# Patient Record
Sex: FEMALE | Race: BLACK OR AFRICAN AMERICAN | NOT HISPANIC OR LATINO | Employment: OTHER | URBAN - METROPOLITAN AREA
[De-identification: names, ages, dates, MRNs, and addresses within clinical notes are randomized per-mention and may not be internally consistent; named-entity substitution may affect disease eponyms.]

---

## 2020-05-14 ENCOUNTER — HOSPITAL ENCOUNTER (OUTPATIENT)
Dept: ULTRASOUND IMAGING | Facility: HOSPITAL | Age: 55
Discharge: HOME | End: 2020-05-14
Attending: INTERNAL MEDICINE

## 2020-05-14 LAB
ALBUMIN SERPL-MCNC: 3.2 G/DL (ref 3.5–5)
ALBUMIN/GLOB SERPL: 0.9 {RATIO} (ref 1.4–2.6)
ALP SERPL-CCNC: 185 U/L (ref 53–141)
ALT SERPL-CCNC: 16 U/L (ref 10–40)
ANION GAP SERPL CALC-SCNC: 19 MMOL/L (ref 8–19)
APPEARANCE UR: ABNORMAL
APTT BLD: 24.4 S (ref 22.2–34.2)
AST SERPL-CCNC: 21 U/L (ref 15–50)
BASOPHILS # BLD AUTO: 0.1 10*3/UL (ref 0–0.2)
BASOPHILS NFR BLD AUTO: 0.8 % (ref 0–3)
BILIRUB SERPL-MCNC: 0.34 MG/DL (ref 0.2–1.3)
BILIRUB UR QL: NEGATIVE
BUN SERPL-MCNC: 52 MG/DL (ref 5–25)
BUN/CREAT SERPL: 13 {RATIO} (ref 6–20)
CALCIUM SERPL-MCNC: 8.8 MG/DL (ref 8.7–10.4)
CHLORIDE SERPL-SCNC: 107 MMOL/L (ref 99–111)
COLOR UR: YELLOW
CONV ABS IMM GRAN: 0.04 10*3/UL (ref 0–0.2)
CONV BACTERIA: ABNORMAL
CONV CO2: 18 MMOL/L (ref 22–32)
CONV COLLECTION SOURCE (UA): ABNORMAL
CONV CREATININE URINE, RANDOM: 63.8 MG/DL (ref 10–300)
CONV CREATININE URINE, RANDOM: 65.3 MG/DL (ref 10–300)
CONV IMMATURE GRAN: 0.3 % (ref 0–1.8)
CONV MICROALBUM.,U,RANDOM: 5187.8 MG/L (ref 0–20)
CONV PROTEIN TO CREATININE RATIO (RANDOM URINE): 12.86 {RATIO} (ref 0–0.1)
CONV TOTAL PROTEIN: 6.9 G/DL (ref 6.3–8.2)
CONV UROBILINOGEN IN URINE BY AUTOMATED TEST STRIP: 0.2 {EHRLICHU}/DL (ref 0.1–1)
CREAT UR-MCNC: 4.12 MG/DL (ref 0.5–0.9)
DEPRECATED RDW RBC AUTO: 45.7 FL (ref 36.4–46.3)
EOSINOPHIL # BLD AUTO: 0.58 10*3/UL (ref 0–0.7)
EOSINOPHIL # BLD AUTO: 4.9 % (ref 0–7)
ERYTHROCYTE [DISTWIDTH] IN BLOOD BY AUTOMATED COUNT: 13.7 % (ref 11.7–14.4)
GFR SERPLBLD BASED ON 1.73 SQ M-ARVRAT: 13 ML/MIN/{1.73_M2}
GLOBULIN UR ELPH-MCNC: 3.7 G/DL (ref 2–3.5)
GLUCOSE SERPL-MCNC: 89 MG/DL (ref 65–99)
GLUCOSE UR QL: 100 MG/DL
HCT VFR BLD AUTO: 19.8 % (ref 37–47)
HGB BLD-MCNC: 6.9 G/DL (ref 12–16)
HGB UR QL STRIP: NEGATIVE
INR PPP: 0.85 (ref 2–3)
KETONES UR QL STRIP: NEGATIVE MG/DL
LEUKOCYTE ESTERASE UR QL STRIP: ABNORMAL
LYMPHOCYTES # BLD AUTO: 3.72 10*3/UL (ref 1–5)
LYMPHOCYTES NFR BLD AUTO: 31.1 % (ref 20–45)
MCH RBC QN AUTO: 31.8 PG (ref 27–31)
MCHC RBC AUTO-ENTMCNC: 34.8 G/DL (ref 33–37)
MCV RBC AUTO: 91.2 FL (ref 81–99)
MICROALBUMIN/CREAT UR: 7944.6 MG/G{CRE} (ref 0–35)
MONOCYTES # BLD AUTO: 0.48 10*3/UL (ref 0.2–1.2)
MONOCYTES NFR BLD AUTO: 4 % (ref 3–10)
NEUTROPHILS # BLD AUTO: 7.03 10*3/UL (ref 2–8)
NEUTROPHILS NFR BLD AUTO: 58.9 % (ref 30–85)
NITRITE UR QL STRIP: NEGATIVE
NRBC CBCN: 5.1 % (ref 0–0.7)
OSMOLALITY SERPL CALC.SUM OF ELEC: 302 MOSM/KG (ref 273–304)
PH UR STRIP.AUTO: 6.5 [PH] (ref 5–8)
PLATELET # BLD AUTO: 387 10*3/UL (ref 130–400)
PMV BLD AUTO: 10.6 FL (ref 9.4–12.3)
POTASSIUM SERPL-SCNC: 4.5 MMOL/L (ref 3.5–5.3)
PROT UR QL: >300 MG/DL
PROT UR-MCNC: 820.7 MG/DL
PROTHROMBIN TIME: 9.5 S (ref 9.4–12)
RBC # BLD AUTO: 2.17 10*6/UL (ref 4.2–5.4)
RBC #/AREA URNS HPF: ABNORMAL /[HPF]
SODIUM SERPL-SCNC: 139 MMOL/L (ref 135–147)
SP GR UR: 1.01 (ref 1–1.03)
SQUAMOUS SPT QL MICRO: ABNORMAL /[HPF]
WBC # BLD AUTO: 11.95 10*3/UL (ref 4.8–10.8)
WBC #/AREA URNS HPF: ABNORMAL /[HPF]

## 2020-05-15 LAB
ALBUMIN SERPL-MCNC: 2.6 G/DL (ref 2.9–4.4)
ALBUMIN/GLOB SERPL: 0.7 {RATIO} (ref 0.7–1.7)
ALPHA2 GLOB SERPL ELPH-MCNC: 0.9 G/DL (ref 0.4–1)
BETA GLOBULIN: 1.2 G/DL (ref 0.7–1.3)
C3 SERPL-MCNC: 115 MG/DL (ref 82–167)
C4 SERPL-MCNC: 43 MG/DL (ref 14–44)
CH50 SERPL-ACNC: >60 U/ML
CONV ALPHA-1-GLOBULIN: 0.3 G/DL (ref 0–0.4)
CONV HEPATITIS B SURFACE AG W CONFIRMATION RE: NEGATIVE
CONV HEPCO: NORMAL
CONV IMMUNOGLOBULIN G (IGG): 1103 MG/DL (ref 586–1602)
CONV IMMUNOGLOBULIN M (IGM): 71 MG/DL (ref 26–217)
CONV PE INTERPRETATION: ABNORMAL
CONV PE NOTE: ABNORMAL
CONV TOTAL PROTEIN: 6.1 G/DL (ref 6–8.5)
DSDNA AB SER-ACNC: <1 IU/ML (ref 0–9)
GAMMA GLOB SERPL ELPH-MCNC: 1.1 G/DL (ref 0.4–1.8)
GLOBULIN UR ELPH-MCNC: 3.5 G/DL (ref 2.2–3.9)
HAV IGM SERPL QL IA: NEGATIVE
HBV CORE AB SER DONR QL IA: NEGATIVE
HBV CORE IGM SERPL QL IA: NEGATIVE
HBV E AB SERPL QL IA: NEGATIVE
HBV E AG SERPL QL IA: NEGATIVE
HBV SURFACE AB SER QL: NON REACTIVE
HCV AB S/CO SERPL IA: >11 S/CO RATIO (ref 0–0.9)
IGA SERPL-MCNC: 363 MG/DL (ref 87–352)
M-SPIKE: ABNORMAL G/DL
PROT PATTERN SERPL IFE-IMP: ABNORMAL

## 2020-05-17 LAB — CONV ANTI NEUTROPHILIC CYTOPLASMIC AB W/REFLEX: NORMAL

## 2020-05-19 LAB — CONV MYELOPEROXIDASE ANTIBODY: <9 U/ML (ref 0–9)

## 2021-01-11 ENCOUNTER — OFFICE VISIT CONVERTED (OUTPATIENT)
Dept: SURGERY | Facility: CLINIC | Age: 56
End: 2021-01-11
Attending: SURGERY

## 2021-01-15 ENCOUNTER — OFFICE VISIT CONVERTED (OUTPATIENT)
Dept: CARDIOLOGY | Facility: CLINIC | Age: 56
End: 2021-01-15
Attending: SPECIALIST

## 2021-01-29 ENCOUNTER — HOSPITAL ENCOUNTER (OUTPATIENT)
Dept: PREADMISSION TESTING | Facility: HOSPITAL | Age: 56
Discharge: HOME | End: 2021-01-29
Attending: SURGERY

## 2021-01-30 LAB — SARS-COV-2 RNA SPEC QL NAA+PROBE: NOT DETECTED

## 2021-02-03 ENCOUNTER — HOSPITAL ENCOUNTER (OUTPATIENT)
Dept: PERIOP | Facility: HOSPITAL | Age: 56
Setting detail: HOSPITAL OUTPATIENT SURGERY
Discharge: HOME | End: 2021-02-03
Attending: SURGERY

## 2021-02-03 LAB
ANION GAP SERPL CALC-SCNC: 19 MMOL/L (ref 8–19)
BASOPHILS # BLD AUTO: 0.14 10*3/UL (ref 0–0.2)
BASOPHILS NFR BLD AUTO: 1.2 % (ref 0–3)
BUN SERPL-MCNC: 30 MG/DL (ref 5–25)
BUN/CREAT SERPL: 5 {RATIO} (ref 6–20)
CALCIUM SERPL-MCNC: 7.2 MG/DL (ref 8.7–10.4)
CHLORIDE SERPL-SCNC: 99 MMOL/L (ref 99–111)
CONV ABS IMM GRAN: 0.04 10*3/UL (ref 0–0.2)
CONV CO2: 19 MMOL/L (ref 22–32)
CONV IMMATURE GRAN: 0.3 % (ref 0–1.8)
CREAT UR-MCNC: 6.37 MG/DL (ref 0.5–0.9)
DEPRECATED RDW RBC AUTO: 43.8 FL (ref 36.4–46.3)
EOSINOPHIL # BLD AUTO: 0.51 10*3/UL (ref 0–0.7)
EOSINOPHIL # BLD AUTO: 4.3 % (ref 0–7)
ERYTHROCYTE [DISTWIDTH] IN BLOOD BY AUTOMATED COUNT: 13.2 % (ref 11.7–14.4)
GFR SERPLBLD BASED ON 1.73 SQ M-ARVRAT: 8 ML/MIN/{1.73_M2}
GLUCOSE SERPL-MCNC: 198 MG/DL (ref 65–99)
HCT VFR BLD AUTO: 28.4 % (ref 37–47)
HGB BLD-MCNC: 10.3 G/DL (ref 12–16)
LYMPHOCYTES # BLD AUTO: 3.62 10*3/UL (ref 1–5)
LYMPHOCYTES NFR BLD AUTO: 30.3 % (ref 20–45)
MCH RBC QN AUTO: 32.5 PG (ref 27–31)
MCHC RBC AUTO-ENTMCNC: 36.3 G/DL (ref 33–37)
MCV RBC AUTO: 89.6 FL (ref 81–99)
MONOCYTES # BLD AUTO: 0.75 10*3/UL (ref 0.2–1.2)
MONOCYTES NFR BLD AUTO: 6.3 % (ref 3–10)
NEUTROPHILS # BLD AUTO: 6.9 10*3/UL (ref 2–8)
NEUTROPHILS NFR BLD AUTO: 57.6 % (ref 30–85)
NRBC CBCN: 2.2 % (ref 0–0.7)
OSMOLALITY SERPL CALC.SUM OF ELEC: 288 MOSM/KG (ref 273–304)
PLATELET # BLD AUTO: 346 10*3/UL (ref 130–400)
PMV BLD AUTO: 10.3 FL (ref 9.4–12.3)
POTASSIUM SERPL-SCNC: 3.6 MMOL/L (ref 3.5–5.3)
RBC # BLD AUTO: 3.17 10*6/UL (ref 4.2–5.4)
SODIUM SERPL-SCNC: 133 MMOL/L (ref 135–147)
WBC # BLD AUTO: 11.96 10*3/UL (ref 4.8–10.8)

## 2021-02-17 ENCOUNTER — HOSPITAL ENCOUNTER (OUTPATIENT)
Dept: OTHER | Facility: HOSPITAL | Age: 56
Discharge: HOME | End: 2021-02-17
Attending: RADIOLOGY

## 2021-02-17 LAB
ANION GAP SERPL CALC-SCNC: 15 MMOL/L (ref 8–19)
APTT BLD: 24.3 S (ref 22.2–34.2)
BASOPHILS # BLD AUTO: 0.09 10*3/UL (ref 0–0.2)
BASOPHILS NFR BLD AUTO: 0.5 % (ref 0–3)
BUN SERPL-MCNC: 47 MG/DL (ref 5–25)
BUN/CREAT SERPL: 5 {RATIO} (ref 6–20)
CALCIUM SERPL-MCNC: 8.6 MG/DL (ref 8.7–10.4)
CHLORIDE SERPL-SCNC: 97 MMOL/L (ref 99–111)
CONV ABS IMM GRAN: 0.08 10*3/UL (ref 0–0.2)
CONV CO2: 26 MMOL/L (ref 22–32)
CONV IMMATURE GRAN: 0.4 % (ref 0–1.8)
CREAT UR-MCNC: 9.6 MG/DL (ref 0.5–0.9)
DEPRECATED RDW RBC AUTO: 43.9 FL (ref 36.4–46.3)
EOSINOPHIL # BLD AUTO: 0.58 10*3/UL (ref 0–0.7)
EOSINOPHIL # BLD AUTO: 3.2 % (ref 0–7)
ERYTHROCYTE [DISTWIDTH] IN BLOOD BY AUTOMATED COUNT: 13.3 % (ref 11.7–14.4)
GFR SERPLBLD BASED ON 1.73 SQ M-ARVRAT: 5 ML/MIN/{1.73_M2}
GLUCOSE SERPL-MCNC: 112 MG/DL (ref 65–99)
HCT VFR BLD AUTO: 22.4 % (ref 37–47)
HGB BLD-MCNC: 8.3 G/DL (ref 12–16)
INR PPP: 0.85 (ref 2–3)
LYMPHOCYTES # BLD AUTO: 4.32 10*3/UL (ref 1–5)
LYMPHOCYTES NFR BLD AUTO: 23.7 % (ref 20–45)
MCH RBC QN AUTO: 33.7 PG (ref 27–31)
MCHC RBC AUTO-ENTMCNC: 37.1 G/DL (ref 33–37)
MCV RBC AUTO: 91.1 FL (ref 81–99)
MONOCYTES # BLD AUTO: 0.94 10*3/UL (ref 0.2–1.2)
MONOCYTES NFR BLD AUTO: 5.2 % (ref 3–10)
NEUTROPHILS # BLD AUTO: 12.23 10*3/UL (ref 2–8)
NEUTROPHILS NFR BLD AUTO: 67 % (ref 30–85)
NRBC CBCN: 0.9 % (ref 0–0.7)
OSMOLALITY SERPL CALC.SUM OF ELEC: 293 MOSM/KG (ref 273–304)
PLATELET # BLD AUTO: 388 10*3/UL (ref 130–400)
PMV BLD AUTO: 10.6 FL (ref 9.4–12.3)
POTASSIUM SERPL-SCNC: 3.3 MMOL/L (ref 3.5–5.3)
PROTHROMBIN TIME: 9.6 S (ref 9.4–12)
RBC # BLD AUTO: 2.46 10*6/UL (ref 4.2–5.4)
SODIUM SERPL-SCNC: 135 MMOL/L (ref 135–147)
WBC # BLD AUTO: 18.24 10*3/UL (ref 4.8–10.8)

## 2021-02-23 ENCOUNTER — CONVERSION ENCOUNTER (OUTPATIENT)
Dept: SURGERY | Facility: CLINIC | Age: 56
End: 2021-02-23

## 2021-02-23 ENCOUNTER — OFFICE VISIT CONVERTED (OUTPATIENT)
Dept: SURGERY | Facility: CLINIC | Age: 56
End: 2021-02-23
Attending: SURGERY

## 2021-03-22 ENCOUNTER — HOSPITAL ENCOUNTER (OUTPATIENT)
Dept: PREADMISSION TESTING | Facility: HOSPITAL | Age: 56
Discharge: HOME | End: 2021-03-22
Attending: SURGERY

## 2021-03-22 ENCOUNTER — OFFICE VISIT CONVERTED (OUTPATIENT)
Dept: SURGERY | Facility: CLINIC | Age: 56
End: 2021-03-22
Attending: SURGERY

## 2021-03-22 LAB — SARS-COV-2 RNA SPEC QL NAA+PROBE: NOT DETECTED

## 2021-03-24 ENCOUNTER — HOSPITAL ENCOUNTER (OUTPATIENT)
Dept: PERIOP | Facility: HOSPITAL | Age: 56
Setting detail: HOSPITAL OUTPATIENT SURGERY
Discharge: HOME | End: 2021-03-24
Attending: SURGERY

## 2021-03-24 LAB
ANION GAP SERPL CALC-SCNC: 15 MMOL/L (ref 8–19)
BASOPHILS # BLD AUTO: 0.2 10*3/UL (ref 0–0.2)
BASOPHILS NFR BLD AUTO: 1.4 % (ref 0–3)
BUN SERPL-MCNC: 10 MG/DL (ref 5–25)
BUN/CREAT SERPL: 2 {RATIO} (ref 6–20)
CALCIUM SERPL-MCNC: 8.6 MG/DL (ref 8.7–10.4)
CHLORIDE SERPL-SCNC: 102 MMOL/L (ref 99–111)
CONV ABS IMM GRAN: 0.06 10*3/UL (ref 0–0.2)
CONV CO2: 23 MMOL/L (ref 22–32)
CONV IMMATURE GRAN: 0.4 % (ref 0–1.8)
CREAT UR-MCNC: 4.84 MG/DL (ref 0.5–0.9)
DEPRECATED RDW RBC AUTO: 45.9 FL (ref 36.4–46.3)
EOSINOPHIL # BLD AUTO: 0.43 10*3/UL (ref 0–0.7)
EOSINOPHIL # BLD AUTO: 3 % (ref 0–7)
ERYTHROCYTE [DISTWIDTH] IN BLOOD BY AUTOMATED COUNT: 14.9 % (ref 11.7–14.4)
GFR SERPLBLD BASED ON 1.73 SQ M-ARVRAT: 11 ML/MIN/{1.73_M2}
GLUCOSE SERPL-MCNC: 97 MG/DL (ref 65–99)
HCT VFR BLD AUTO: 29.9 % (ref 37–47)
HGB BLD-MCNC: 10.7 G/DL (ref 12–16)
LYMPHOCYTES # BLD AUTO: 3.97 10*3/UL (ref 1–5)
LYMPHOCYTES NFR BLD AUTO: 27.8 % (ref 20–45)
MCH RBC QN AUTO: 30.6 PG (ref 27–31)
MCHC RBC AUTO-ENTMCNC: 35.8 G/DL (ref 33–37)
MCV RBC AUTO: 85.4 FL (ref 81–99)
MONOCYTES # BLD AUTO: 1.12 10*3/UL (ref 0.2–1.2)
MONOCYTES NFR BLD AUTO: 7.8 % (ref 3–10)
NEUTROPHILS # BLD AUTO: 8.5 10*3/UL (ref 2–8)
NEUTROPHILS NFR BLD AUTO: 59.6 % (ref 30–85)
NRBC CBCN: 1.3 % (ref 0–0.7)
OSMOLALITY SERPL CALC.SUM OF ELEC: 279 MOSM/KG (ref 273–304)
PLATELET # BLD AUTO: 353 10*3/UL (ref 130–400)
PMV BLD AUTO: 10.6 FL (ref 9.4–12.3)
POTASSIUM SERPL-SCNC: 4.6 MMOL/L (ref 3.5–5.3)
RBC # BLD AUTO: 3.5 10*6/UL (ref 4.2–5.4)
SODIUM SERPL-SCNC: 135 MMOL/L (ref 135–147)
WBC # BLD AUTO: 14.28 10*3/UL (ref 4.8–10.8)

## 2021-03-26 LAB
BACTERIA SPEC ANAEROBE CULT: NORMAL
BACTERIA SPEC ANAEROBE CULT: NORMAL

## 2021-03-27 LAB
BACTERIA FLD CULT: NORMAL
BACTERIA SPEC AEROBE CULT: NORMAL

## 2021-04-06 ENCOUNTER — OFFICE VISIT CONVERTED (OUTPATIENT)
Dept: SURGERY | Facility: CLINIC | Age: 56
End: 2021-04-06
Attending: SURGERY

## 2021-05-10 NOTE — H&P
History and Physical      Patient Name: Clarissa Augustin   Patient ID: [id number]   Sex: Female   YOB: 1965    Referring Provider: Ryley Moran    Visit Date: January 11, 2021    Provider: Miki Mendoza MD   Location: [facility]   Location Address: [address] [place] [address]  [id number]   Location Phone: [phone]          Chief Complaint  · Outpatient History & Physical / Surgical Orders  · ESRD      History Of Present Illness     Ms. Augustin came in today for evaluation. She is a really nice lady who had end stage renal disease due to sickle cell anemia. She is currently undergoing hemodialysis through a tunnel dialysis catheter. She would like to try peritoneal dialysis if at all possible.       Past Medical History  Anemia, Unspecified; Blood Diseases; Heart Attack; High blood pressure; Kidney Disease         Past Surgical History  Appendectomy; Gallbladder         Medication List  acetaminophen 500 mg oral tablet; hydralazine 25 mg oral tablet; metoprolol succinate 50 mg oral tablet extended release 24 hr; nitroglycerin 0.4 mg sublingual tablet, sublingual         Allergy List  Latex Exam Gloves         Family Medical History  Diabetes, unspecified type; Family history of breast cancer         Social History  Tobacco (Current every day)         Review of Systems  · Constitutional  o Denies  o : fever  · Eyes  o Denies  o : yellowish discoloration of eyes  · HENT  o Denies  o : difficulty swallowing  · Cardiovascular  o Denies  o : chest pain on exertion  · Respiratory  o Denies  o : shortness of breath  · Gastrointestinal  o Denies  o : nausea, vomiting, diarrhea, constipation  · Integument  o Denies  o : rash  · Neurologic  o Denies  o : tingling or numbness  · Musculoskeletal  o Denies  o : joint pain  · Endocrine  o Denies  o : weight gain, weight loss      Vitals  Date Time BP Position Site L\R Cuff Size HR RR TEMP (F) WT  HT  BMI kg/m2 BSA m2 O2 Sat  "FR L/min FiO2 HC       01/11/2021 01:18 PM       16  168lbs 0oz 5'  6\" 27.12 1.88             Physical Examination  · Constitutional  o Appearance  o : well-nourished, well developed, alert, in no acute distress  · Head and Face  o Head  o :   § Inspection  § : atraumatic, normocephalic  · Neck  o Inspection/Palpation  o : supple, normal range of motion  · Respiratory  o Inspection of Chest  o : normal inspection  o Auscultation of Lungs  o : breath sounds normal, no distress, clear to ascultate bilaterally  · Cardiovascular  o Heart  o :   § Auscultation of Heart  § : regular rate and rhythm, no murmur, gallop or rub  · Gastrointestinal  o Abdominal Examination  o : normal bowel sounds, non-tender, soft          Assessment  · Pre-Surgical Orders     V72.84  · ESRD (end stage renal disease)     585.6/N18.6       Very nice lady who has end stage renal disease. She would like to try peritoneal dialysis.       Plan  · Orders  o [facility] Pre-Op Covid-19 Screening (92857) - 585.6/N18.6 - 01/29/2021  o General Surgery Order ([facility]) - - 01/11/2021  · Medications  o Medications have been Reconciled  o Transition of Care or Provider Policy  · Instructions  o ****Surgical Orders****  o Outpatient  o RISK AND BENEFITS:  o Consent for surgery: Given these options, the patient has verbally expressed an understanding of the risks of surgery and finds these risks acceptable. We will proceed with surgery as soon as possible.  o Consult Anesthesia for any post-operative block, or any pain management procedure deemed necessary by the anestesiologist for adequate post-operative pain control.   o O.R. PREP: Per protocol  o SCD's preoperatively  o PLEASE SIGN PERMIT FOR: Laparoscopic peritoneal dialysis catheter placement  o *__Kefzol 2 gram IV on call to OR.  o *___The above History and Physical Examination has been completed within 30 days of admission.     We will set her up for a laparoscopic peritoneal dialysis catheter placement. I " have described the procedure to her as well as the risks and benefits and she is agreeable to proceeding.             Electronically Signed by: Iesha Meadows MA -Author on January 28, 2021 10:24:06 AM

## 2021-05-14 VITALS — WEIGHT: 168 LBS | HEIGHT: 66 IN | RESPIRATION RATE: 16 BRPM | BODY MASS INDEX: 27 KG/M2

## 2021-05-14 VITALS — WEIGHT: 166 LBS | HEIGHT: 66 IN | BODY MASS INDEX: 26.68 KG/M2 | RESPIRATION RATE: 14 BRPM

## 2021-05-14 VITALS — BODY MASS INDEX: 26.52 KG/M2 | HEIGHT: 66 IN | RESPIRATION RATE: 16 BRPM | WEIGHT: 165 LBS

## 2021-05-14 VITALS
SYSTOLIC BLOOD PRESSURE: 182 MMHG | DIASTOLIC BLOOD PRESSURE: 88 MMHG | WEIGHT: 166 LBS | HEART RATE: 78 BPM | BODY MASS INDEX: 26.68 KG/M2 | HEIGHT: 66 IN

## 2021-05-14 VITALS — WEIGHT: 166 LBS | RESPIRATION RATE: 16 BRPM | HEIGHT: 66 IN | BODY MASS INDEX: 26.68 KG/M2

## 2021-05-14 NOTE — PROGRESS NOTES
"   Progress Note      Patient Name: Clarissa Augustin   Patient ID: [id number]   Sex: Female   YOB: 1965    Referring Provider: Ryley Moran    Visit Date: January 15, 2021    Provider: Fabian Gutierrez MD   [facility]   Location Address: [address] [place] [address], [address]   [place]  [id number]   Location Phone: [phone]          Chief Complaint  · Hypertension       History Of Present Illness  Clarissa Augustin is a 55-year-old female with sickle cell anemia and end-stage renal disease, on dialysis. Blood pressure is intermittently high.   CURRENT MEDICATIONS: Carvedilol 3.125 mg 3 tablets b.i.d. p.r.n.; folic acid 1 mg daily; hydrocodone/acetaminophen 5-325 mg; magnesium oxide 400 mg daily; Sennosides-Docusate; Amlodipine 10 mg daily; Levofloxacin 750 mg daily; Metoprolol tartrate 50 mg daily; nitroglycerin 0.4 mg p.r.n.; Albuterol p.r.n.   PAST MEDICAL HISTORY: Sickle cell anemia; metabolic acidosis; hypertension; chronic atypical chest pain; end-stage renal disease, on dialysis.   PSYCHOSOCIAL HISTORY: Denies alcohol use. Current tobacco use.      ALLERGIES:  No known drug allergies.       Review of Systems  · Cardiovascular  o Denies  o : palpitations (fast, fluttering, or skipping beats), swelling (feet, ankles, hands), shortness of breath while walking or lying flat, chest pain or angina pectoris   · Respiratory  o Denies  o : chronic or frequent cough      Vitals  Date Time BP Position Site L\R Cuff Size HR RR TEMP (F) WT  HT  BMI kg/m2 BSA m2 O2 Sat FR L/min FiO2 HC       01/15/2021 11:35 /88 Sitting    78 - R   165lbs 16oz 5'  6\" 26.79 1.87       01/15/2021 11:35 /84 Sitting    78 - R                   Physical Examination  · Constitutional  o Appearance  o : Awake, alert, cooperative, pleasant.  · Respiratory  o Inspection of Chest  o : No chest wall deformities, moving equal.  o Auscultation of Lungs  o : Good air entry with vesicular breath " sounds.  · Cardiovascular  o Heart  o :   § Auscultation of Heart  § : S1 and S2 regular. No S3. No S4. No murmurs.  o Peripheral Vascular System  o :   § Extremities  § : Peripheral pulses were well felt. No edema. No cyanosis.  · Gastrointestinal  o Abdominal Examination  o : No masses or organomegaly noted.          Assessment     ASSESSMENT AND PLAN:  1.  End-stage renal disease, on dialysis, stable.   2.  Essential hypertension, uncontrolled. Asked her to discontinue Metoprolol. Increase Carvedilol to 12.5 mg        b.i.d. Explained at length about compliance with medication. Recent echocardiogram showed normal left        ventricular systolic function. She has had a stress test in [place] which was negative. Will get her        stress test report from [place].      Fabian Gutierrez MD  TER[facility]/pap             Electronically Signed by: Katherine Saeed-, Other -Author on January 28, 2021 02:49:06 PM  Electronically Co-signed by: Fabian Gutierrez MD -Reviewer on February 8, 2021 09:17:09 AM

## 2021-05-14 NOTE — PROGRESS NOTES
"   Progress Note      Patient Name: Clarissa Augustin   Patient ID: [id number]   Sex: Female   YOB: 1965    Referring Provider: Ryley Moran    Visit Date: April 6, 2021    Provider: Miki Mendoza MD   Location: [facility]   Location Address: [address] [place] [address]  [id number]   Location Phone: [phone]          Chief Complaint  · Follow Up Office Visit      History Of Present Illness     Ms. Augustin came in today for evaluation. We did a laparoscopy for her and it turns out she did have peritonitis. We removed that catheter. She is doing fine now and had no complaints.       Past Medical History  Anemia, Unspecified; Blood Diseases; Heart Attack; High blood pressure; Kidney Disease         Past Surgical History  Appendectomy; Gallbladder; PD Catheter Placement         Medication List  acetaminophen 500 mg oral tablet; hydralazine 25 mg oral tablet; metoprolol succinate 50 mg oral tablet extended release 24 hr; nitroglycerin 0.4 mg sublingual tablet, sublingual         Allergy List  Latex Exam Gloves         Family Medical History  Diabetes, unspecified type; Family history of breast cancer         Social History  Tobacco (Current every day)         Review of Systems  · Cardiovascular  o Denies  o : chest pain, irregular heart beats, rapid heart rate, chest pain on exertion, shortness of breath, lower extremity swelling  · Respiratory  o Denies  o : shortness of breath, wheezing, cough, wheezing, chronic cough, coughing up blood  · Gastrointestinal  o Denies  o : nausea, vomiting, diarrhea, chronic abdominal pain, reflux symptoms      Vitals  Date Time BP Position Site L\R Cuff Size HR RR TEMP (F) WT  HT  BMI kg/m2 BSA m2 O2 Sat FR L/min FiO2        04/06/2021 01:17 PM       14  165lbs 16oz 5'  6\" 26.79 1.87             Physical Examination     Today on physical exam, her incisions all look good and we removed her sutures. "           Assessment  · Postoperative Exam Following Surgery     V67.00       Doing better status post removal of a peritoneal dialysis catheter for peritonitis.       Plan  · Medications  o Medications have been Reconciled  o Transition of Care or Provider Policy     I have told Ms. Augustin that I would probably wait at least four months before trying to replace the catheter, just to make sure we didn't run the risk of re-infecting it. She was fine with that and indicated that she would come back and see me in about four months.             Electronically Signed by: Mary Ann Fowler-, -Author on April 7, 2021 09:30:01 AM  Electronically Co-signed by: Miki Mendoza MD -Reviewer on April 7, 2021 02:34:50 PM

## 2021-05-14 NOTE — PROGRESS NOTES
"   Progress Note      Patient Name: Clarissa Augustin   Patient ID: [id number]   Sex: Female   YOB: 1965    Referring Provider: Ryley Moran    Visit Date: February 23, 2021    Provider: Miki Mendoza MD   Location: [facility]   Location Address: [address] [place] [address]  [id number]   Location Phone: [phone]          Chief Complaint  · Follow Up Office Visit      History Of Present Illness     Ms. Augustin came in today for evaluation. She is doing well following a laparoscopic peritoneal dialysis catheter placement. She is without complaints today.       Past Medical History  Anemia, Unspecified; Blood Diseases; Heart Attack; High blood pressure; Kidney Disease         Past Surgical History  Appendectomy; Gallbladder; PD Catheter Placement         Medication List  acetaminophen 500 mg oral tablet; hydralazine 25 mg oral tablet; metoprolol succinate 50 mg oral tablet extended release 24 hr; nitroglycerin 0.4 mg sublingual tablet, sublingual         Allergy List  Latex Exam Gloves         Family Medical History  Diabetes, unspecified type; Family history of breast cancer         Social History  Tobacco (Current every day)         Review of Systems  · Cardiovascular  o Denies  o : chest pain, irregular heart beats, rapid heart rate, chest pain on exertion, shortness of breath, lower extremity swelling  · Respiratory  o Denies  o : shortness of breath, wheezing, cough, wheezing, chronic cough, coughing up blood  · Gastrointestinal  o Denies  o : nausea, vomiting, diarrhea, chronic abdominal pain, reflux symptoms      Vitals  Date Time BP Position Site L\R Cuff Size HR RR TEMP (F) WT  HT  BMI kg/m2 BSA m2 O2 Sat FR L/min FiO2 HC       02/23/2021 12:50 PM       16  165lbs 0oz 5'  6\" 26.63 1.87             Physical Examination     Today on physical exam, the exit site looks good and we removed the suture.           Assessment  · Postoperative Exam Following " Surgery     V67.00       Doing okay status post laparoscopic peritoneal dialysis catheter placement.       Plan  · Medications  o Medications have been Reconciled  o Transition of Care or Provider Policy  · Instructions  o Follow up as needed.            Electronically Signed by: Mary Ann Fowler-, -Author on February 23, 2021 04:38:02 PM  Electronically Co-signed by: Miki Mendoza MD -Reviewer on February 24, 2021 08:37:27 AM

## 2021-08-16 RX ORDER — CARVEDILOL 25 MG/1
25 TABLET ORAL 2 TIMES DAILY
COMMUNITY
Start: 2021-07-19

## 2021-08-16 RX ORDER — ONDANSETRON 4 MG/1
4 TABLET, FILM COATED ORAL EVERY 6 HOURS
COMMUNITY
Start: 2021-07-31

## 2021-08-16 RX ORDER — SODIUM BICARBONATE 650 MG/1
650 TABLET ORAL 2 TIMES DAILY
COMMUNITY
Start: 2021-07-19

## 2021-08-16 RX ORDER — MAGNESIUM OXIDE 400 MG/1
1 TABLET ORAL DAILY
COMMUNITY
Start: 2021-07-19

## 2021-08-16 RX ORDER — LOSARTAN POTASSIUM 100 MG/1
80 TABLET ORAL 2 TIMES DAILY
COMMUNITY
Start: 2021-07-19

## 2021-08-16 RX ORDER — FOLIC ACID 1 MG/1
1000 TABLET ORAL DAILY
COMMUNITY
Start: 2021-07-28

## 2021-08-16 RX ORDER — AMLODIPINE BESYLATE 10 MG/1
10 TABLET ORAL DAILY
COMMUNITY
Start: 2021-07-19

## 2021-08-16 RX ORDER — FUROSEMIDE 80 MG
80 TABLET ORAL 2 TIMES DAILY
COMMUNITY
Start: 2021-07-19

## 2021-08-16 RX ORDER — DOCUSATE SODIUM -SENNOSIDES 50; 8.6 MG/1; MG/1
1 TABLET, COATED ORAL 2 TIMES DAILY
COMMUNITY
Start: 2021-07-19

## 2021-08-16 RX ORDER — LOSARTAN POTASSIUM 50 MG/1
50 TABLET ORAL DAILY
COMMUNITY
Start: 2021-06-02 | End: 2021-08-20

## 2021-08-17 ENCOUNTER — OFFICE VISIT (OUTPATIENT)
Dept: SURGERY | Facility: CLINIC | Age: 56
End: 2021-08-17

## 2021-08-17 ENCOUNTER — PREP FOR SURGERY (OUTPATIENT)
Dept: OTHER | Facility: HOSPITAL | Age: 56
End: 2021-08-17

## 2021-08-17 VITALS — WEIGHT: 144.8 LBS | BODY MASS INDEX: 23.27 KG/M2 | RESPIRATION RATE: 16 BRPM | HEIGHT: 66 IN

## 2021-08-17 DIAGNOSIS — N18.6 ESRD (END STAGE RENAL DISEASE) (HCC): Primary | ICD-10-CM

## 2021-08-17 PROCEDURE — 99213 OFFICE O/P EST LOW 20 MIN: CPT | Performed by: SURGERY

## 2021-08-17 RX ORDER — ONDANSETRON 2 MG/ML
4 INJECTION INTRAMUSCULAR; INTRAVENOUS EVERY 6 HOURS PRN
Status: CANCELLED | OUTPATIENT
Start: 2021-08-17

## 2021-08-17 RX ORDER — SODIUM CHLORIDE, SODIUM LACTATE, POTASSIUM CHLORIDE, CALCIUM CHLORIDE 600; 310; 30; 20 MG/100ML; MG/100ML; MG/100ML; MG/100ML
70 INJECTION, SOLUTION INTRAVENOUS CONTINUOUS
Status: CANCELLED | OUTPATIENT
Start: 2021-08-17

## 2021-08-17 RX ORDER — SODIUM CHLORIDE 0.9 % (FLUSH) 0.9 %
10 SYRINGE (ML) INJECTION AS NEEDED
Status: CANCELLED | OUTPATIENT
Start: 2021-08-17

## 2021-08-17 RX ORDER — SODIUM CHLORIDE 0.9 % (FLUSH) 0.9 %
10 SYRINGE (ML) INJECTION EVERY 12 HOURS SCHEDULED
Status: CANCELLED | OUTPATIENT
Start: 2021-08-17

## 2021-08-17 RX ORDER — CEFAZOLIN SODIUM 2 G/100ML
2 INJECTION, SOLUTION INTRAVENOUS ONCE
Status: CANCELLED | OUTPATIENT
Start: 2021-08-17 | End: 2021-08-17

## 2021-08-17 NOTE — PROGRESS NOTES
Inpatient History and Physical Surgical Orders    Preadmission Location:   Preadmission Time:  Facility:  Surgery Date:  Surgery Time:  Preadmission Test date:     Chief Complaint  Outpatient History and Physical / Surgical Orders    Primary Care Provider: Mirian Caicedo    Referring Provider: No ref. provider found    Subjective      Patient Name: Clarissa Augustin : 1965    HPI  The patient came back for follow-up today.  She is a nice lady that had a peritoneal dialysis catheter removed for infection about 4 months ago.  She is interested in trying to do peritoneal dialysis again and would like to have an attempt at a laparoscopic placement if at all possible.    Past History:  Medical History: has a past medical history of Anemia, Heart attack (CMS/HCC), High blood pressure, blood diseases, and Kidney disease.   Surgical History: has a past surgical history that includes Appendectomy; Gallbladder surgery; and Peritoneal catheter insertion.   Family History: family history includes Breast cancer in an other family member; Diabetes in her mother.   Social History: reports that she has been smoking. She has never used smokeless tobacco.  Allergies: Latex       Current Outpatient Medications:   •  acetaminophen (TYLENOL) 500 MG tablet, acetaminophen 500 mg oral tablet take 2 tablets (1,000 mg) by oral route every 6 hours as needed   Active, Disp: , Rfl:   •  amLODIPine (NORVASC) 10 MG tablet, Take 10 mg by mouth Daily., Disp: , Rfl:   •  carvedilol (COREG) 25 MG tablet, Take 25 mg by mouth 2 (Two) Times a Day., Disp: , Rfl:   •  folic acid (FOLVITE) 1 MG tablet, Take 1,000 mcg by mouth Daily., Disp: , Rfl:   •  furosemide (LASIX) 80 MG tablet, Take 80 mg by mouth 2 (Two) Times a Day., Disp: , Rfl:   •  hydrALAZINE (APRESOLINE) 25 MG tablet, hydralazine 25 mg oral tablet take 1 tablet (25 mg) by oral route 2 times per day with food   Active, Disp: , Rfl:   •  losartan (COZAAR) 100 MG tablet, Take  "100 mg by mouth Daily., Disp: , Rfl:   •  magnesium oxide (MAG-OX) 400 MG tablet, Take 1 tablet by mouth Daily., Disp: , Rfl:   •  metoprolol succinate XL (TOPROL-XL) 50 MG 24 hr tablet, metoprolol succinate 50 mg oral tablet extended release 24 hr take 1 tablet (50 mg) by oral route once daily   Active, Disp: , Rfl:   •  nitroglycerin (NITROSTAT) 0.4 MG SL tablet, nitroglycerin 0.4 mg sublingual tablet, sublingual place 1 tablet (0.4 mg) by buccal route at the first sign of an attack; no more than 3 tabs are recommended within a 15 minute period.   Active, Disp: , Rfl:   •  ondansetron (ZOFRAN) 4 MG tablet, TAKE (1) TABLET BY MOUTH EVERY 6 HOURS, Disp: , Rfl:   •  ProAir  (90 Base) MCG/ACT inhaler, INHALE TWO (2) PUFFS FOUR TIMES A DAY AS NEEDED FOR SHORTNESS OF BREATH/FOR WHEEZING, Disp: , Rfl:   •  Senexon-S 8.6-50 MG per tablet, Take 1 tablet by mouth 2 (Two) Times a Day., Disp: , Rfl:   •  sodium bicarbonate 650 MG tablet, Take 650 mg by mouth 2 (Two) Times a Day., Disp: , Rfl:   •  losartan (COZAAR) 50 MG tablet, Take 50 mg by mouth Daily., Disp: , Rfl:        Objective   Vital Signs:   Resp 16   Ht 167.6 cm (66\")   Wt 65.7 kg (144 lb 12.8 oz)   BMI 23.37 kg/m²       Physical Exam  Vitals and nursing note reviewed.   Constitutional:       Appearance: Normal appearance.  She e is well-developed.   Cardiovascular:      Rate and Rhythm: Normal rate and regular rhythm.   Pulmonary:      Effort: Pulmonary effort is normal.      Breath sounds: Normal air entry.   Abdominal:      General: Bowel sounds are normal.      Palpations: Abdomen is soft.      Skin:     General: Skin is warm and dry.   Neurological:      Mental Status: He is alert and oriented to person, place, and time.      Motor: Motor function is intact.   Psychiatric:         Mood and Affect: Mood normal.       Result Review :               Assessment and Plan   Diagnoses and all orders for this visit:    1. ESRD (end stage renal disease) " (CMS/HCC) (Primary)    We will set her up for a laparoscopic peritoneal dialysis catheter placement.  Risk benefits and alternatives were explained.    JOHANNA Mendoza MD  08/17/2021

## 2021-08-20 ENCOUNTER — LAB (OUTPATIENT)
Dept: LAB | Facility: HOSPITAL | Age: 56
End: 2021-08-20

## 2021-08-20 DIAGNOSIS — N18.6 ESRD (END STAGE RENAL DISEASE) (HCC): ICD-10-CM

## 2021-08-20 PROCEDURE — C9803 HOPD COVID-19 SPEC COLLECT: HCPCS

## 2021-08-20 PROCEDURE — U0003 INFECTIOUS AGENT DETECTION BY NUCLEIC ACID (DNA OR RNA); SEVERE ACUTE RESPIRATORY SYNDROME CORONAVIRUS 2 (SARS-COV-2) (CORONAVIRUS DISEASE [COVID-19]), AMPLIFIED PROBE TECHNIQUE, MAKING USE OF HIGH THROUGHPUT TECHNOLOGIES AS DESCRIBED BY CMS-2020-01-R: HCPCS

## 2021-08-23 ENCOUNTER — ANESTHESIA EVENT (OUTPATIENT)
Dept: PERIOP | Facility: HOSPITAL | Age: 56
End: 2021-08-23

## 2021-08-23 LAB — SARS-COV-2 RNA RESP QL NAA+PROBE: NOT DETECTED

## 2021-08-26 ENCOUNTER — HOSPITAL ENCOUNTER (OUTPATIENT)
Facility: HOSPITAL | Age: 56
Setting detail: HOSPITAL OUTPATIENT SURGERY
Discharge: HOME | End: 2021-08-26
Attending: SURGERY | Admitting: SURGERY

## 2021-08-26 ENCOUNTER — ANESTHESIA (OUTPATIENT)
Dept: PERIOP | Facility: HOSPITAL | Age: 56
End: 2021-08-26

## 2021-08-26 VITALS
BODY MASS INDEX: 22.5 KG/M2 | WEIGHT: 140 LBS | HEART RATE: 89 BPM | TEMPERATURE: 97.7 F | HEIGHT: 66 IN | SYSTOLIC BLOOD PRESSURE: 151 MMHG | RESPIRATION RATE: 16 BRPM | DIASTOLIC BLOOD PRESSURE: 91 MMHG | OXYGEN SATURATION: 96 %

## 2021-08-26 DIAGNOSIS — N18.6 ESRD (END STAGE RENAL DISEASE) (HCC): ICD-10-CM

## 2021-08-26 LAB
ANION GAP SERPL CALCULATED.3IONS-SCNC: 17.3 MMOL/L (ref 5–15)
B-HCG UR QL: NEGATIVE
BUN SERPL-MCNC: 58 MG/DL (ref 6–20)
BUN/CREAT SERPL: 6.9 (ref 7–25)
CALCIUM SPEC-SCNC: 9.1 MG/DL (ref 8.6–10.5)
CHLORIDE SERPL-SCNC: 101 MMOL/L (ref 98–107)
CO2 SERPL-SCNC: 19.7 MMOL/L (ref 22–29)
CREAT SERPL-MCNC: 8.41 MG/DL (ref 0.57–1)
GFR SERPL CREATININE-BSD FRML MDRD: 6 ML/MIN/1.73
GFR SERPL CREATININE-BSD FRML MDRD: ABNORMAL ML/MIN/{1.73_M2}
GLUCOSE SERPL-MCNC: 111 MG/DL (ref 65–99)
POTASSIUM SERPL-SCNC: 4.5 MMOL/L (ref 3.5–5.2)
QT INTERVAL: 427 MS
SODIUM SERPL-SCNC: 138 MMOL/L (ref 136–145)

## 2021-08-26 PROCEDURE — 25010000002 DEXAMETHASONE PER 1 MG: Performed by: NURSE ANESTHETIST, CERTIFIED REGISTERED

## 2021-08-26 PROCEDURE — 25010000003 CEFAZOLIN IN DEXTROSE 2-4 GM/100ML-% SOLUTION: Performed by: SURGERY

## 2021-08-26 PROCEDURE — 0WHG43Z INSERTION OF INFUSION DEVICE INTO PERITONEAL CAVITY, PERCUTANEOUS ENDOSCOPIC APPROACH: ICD-10-PCS | Performed by: SURGERY

## 2021-08-26 PROCEDURE — 25010000002 PROPOFOL 10 MG/ML EMULSION: Performed by: NURSE ANESTHETIST, CERTIFIED REGISTERED

## 2021-08-26 PROCEDURE — C1750 CATH, HEMODIALYSIS,LONG-TERM: HCPCS | Performed by: SURGERY

## 2021-08-26 PROCEDURE — 25010000002 HEPARIN (PORCINE) PER 1000 UNITS: Performed by: SURGERY

## 2021-08-26 PROCEDURE — 49324 LAP INSERT TUNNEL IP CATH: CPT | Performed by: SURGERY

## 2021-08-26 PROCEDURE — 25010000002 ONDANSETRON PER 1 MG: Performed by: NURSE ANESTHETIST, CERTIFIED REGISTERED

## 2021-08-26 PROCEDURE — 93005 ELECTROCARDIOGRAM TRACING: CPT | Performed by: ANESTHESIOLOGY

## 2021-08-26 PROCEDURE — 81025 URINE PREGNANCY TEST: CPT | Performed by: SURGERY

## 2021-08-26 PROCEDURE — 25010000002 FENTANYL CITRATE (PF) 50 MCG/ML SOLUTION: Performed by: NURSE ANESTHETIST, CERTIFIED REGISTERED

## 2021-08-26 PROCEDURE — 25010000002 HYDROMORPHONE 1 MG/ML SOLUTION: Performed by: NURSE ANESTHETIST, CERTIFIED REGISTERED

## 2021-08-26 PROCEDURE — 80048 BASIC METABOLIC PNL TOTAL CA: CPT | Performed by: ANESTHESIOLOGY

## 2021-08-26 PROCEDURE — 25010000002 MIDAZOLAM PER 1MG: Performed by: ANESTHESIOLOGY

## 2021-08-26 DEVICE — IMPLANTABLE DEVICE: Type: IMPLANTABLE DEVICE | Site: ABDOMEN | Status: FUNCTIONAL

## 2021-08-26 RX ORDER — LIDOCAINE HYDROCHLORIDE 20 MG/ML
INJECTION, SOLUTION INFILTRATION; PERINEURAL AS NEEDED
Status: DISCONTINUED | OUTPATIENT
Start: 2021-08-26 | End: 2021-08-26 | Stop reason: SURG

## 2021-08-26 RX ORDER — IBUPROFEN 600 MG/1
600 TABLET ORAL EVERY 6 HOURS PRN
Status: DISCONTINUED | OUTPATIENT
Start: 2021-08-26 | End: 2021-08-26 | Stop reason: HOSPADM

## 2021-08-26 RX ORDER — HYDROCODONE BITARTRATE AND ACETAMINOPHEN 5; 325 MG/1; MG/1
1-2 TABLET ORAL EVERY 4 HOURS PRN
Qty: 10 TABLET | Refills: 0 | Status: SHIPPED | OUTPATIENT
Start: 2021-08-26

## 2021-08-26 RX ORDER — PROMETHAZINE HYDROCHLORIDE 12.5 MG/1
25 TABLET ORAL ONCE AS NEEDED
Status: DISCONTINUED | OUTPATIENT
Start: 2021-08-26 | End: 2021-08-26 | Stop reason: HOSPADM

## 2021-08-26 RX ORDER — ONDANSETRON 2 MG/ML
4 INJECTION INTRAMUSCULAR; INTRAVENOUS ONCE AS NEEDED
Status: DISCONTINUED | OUTPATIENT
Start: 2021-08-26 | End: 2021-08-26 | Stop reason: HOSPADM

## 2021-08-26 RX ORDER — ONDANSETRON 4 MG/1
4 TABLET, FILM COATED ORAL ONCE AS NEEDED
Status: DISCONTINUED | OUTPATIENT
Start: 2021-08-26 | End: 2021-08-26 | Stop reason: HOSPADM

## 2021-08-26 RX ORDER — FENTANYL CITRATE 50 UG/ML
INJECTION, SOLUTION INTRAMUSCULAR; INTRAVENOUS AS NEEDED
Status: DISCONTINUED | OUTPATIENT
Start: 2021-08-26 | End: 2021-08-26 | Stop reason: SURG

## 2021-08-26 RX ORDER — OXYCODONE HYDROCHLORIDE 5 MG/1
5 TABLET ORAL
Status: DISCONTINUED | OUTPATIENT
Start: 2021-08-26 | End: 2021-08-26 | Stop reason: HOSPADM

## 2021-08-26 RX ORDER — ROCURONIUM BROMIDE 10 MG/ML
INJECTION, SOLUTION INTRAVENOUS AS NEEDED
Status: DISCONTINUED | OUTPATIENT
Start: 2021-08-26 | End: 2021-08-26 | Stop reason: SURG

## 2021-08-26 RX ORDER — HYDROCODONE BITARTRATE AND ACETAMINOPHEN 5; 325 MG/1; MG/1
1 TABLET ORAL ONCE AS NEEDED
Status: DISCONTINUED | OUTPATIENT
Start: 2021-08-26 | End: 2021-08-26 | Stop reason: HOSPADM

## 2021-08-26 RX ORDER — SODIUM CHLORIDE 0.9 % (FLUSH) 0.9 %
10 SYRINGE (ML) INJECTION EVERY 12 HOURS SCHEDULED
Status: DISCONTINUED | OUTPATIENT
Start: 2021-08-26 | End: 2021-08-26 | Stop reason: HOSPADM

## 2021-08-26 RX ORDER — BUPIVACAINE HYDROCHLORIDE AND EPINEPHRINE 2.5; 5 MG/ML; UG/ML
INJECTION, SOLUTION EPIDURAL; INFILTRATION; INTRACAUDAL; PERINEURAL AS NEEDED
Status: DISCONTINUED | OUTPATIENT
Start: 2021-08-26 | End: 2021-08-26 | Stop reason: HOSPADM

## 2021-08-26 RX ORDER — DEXAMETHASONE SODIUM PHOSPHATE 4 MG/ML
INJECTION, SOLUTION INTRA-ARTICULAR; INTRALESIONAL; INTRAMUSCULAR; INTRAVENOUS; SOFT TISSUE AS NEEDED
Status: DISCONTINUED | OUTPATIENT
Start: 2021-08-26 | End: 2021-08-26 | Stop reason: SURG

## 2021-08-26 RX ORDER — SODIUM CHLORIDE, SODIUM LACTATE, POTASSIUM CHLORIDE, CALCIUM CHLORIDE 600; 310; 30; 20 MG/100ML; MG/100ML; MG/100ML; MG/100ML
70 INJECTION, SOLUTION INTRAVENOUS CONTINUOUS
Status: DISCONTINUED | OUTPATIENT
Start: 2021-08-26 | End: 2021-08-26 | Stop reason: HOSPADM

## 2021-08-26 RX ORDER — CEFAZOLIN SODIUM 2 G/100ML
2 INJECTION, SOLUTION INTRAVENOUS ONCE
Status: COMPLETED | OUTPATIENT
Start: 2021-08-26 | End: 2021-08-26

## 2021-08-26 RX ORDER — MIDAZOLAM HYDROCHLORIDE 2 MG/2ML
2 INJECTION, SOLUTION INTRAMUSCULAR; INTRAVENOUS ONCE
Status: COMPLETED | OUTPATIENT
Start: 2021-08-26 | End: 2021-08-26

## 2021-08-26 RX ORDER — PROMETHAZINE HYDROCHLORIDE 25 MG/1
25 SUPPOSITORY RECTAL ONCE AS NEEDED
Status: DISCONTINUED | OUTPATIENT
Start: 2021-08-26 | End: 2021-08-26 | Stop reason: HOSPADM

## 2021-08-26 RX ORDER — ONDANSETRON 2 MG/ML
INJECTION INTRAMUSCULAR; INTRAVENOUS AS NEEDED
Status: DISCONTINUED | OUTPATIENT
Start: 2021-08-26 | End: 2021-08-26 | Stop reason: SURG

## 2021-08-26 RX ORDER — MEPERIDINE HYDROCHLORIDE 25 MG/ML
12.5 INJECTION INTRAMUSCULAR; INTRAVENOUS; SUBCUTANEOUS
Status: DISCONTINUED | OUTPATIENT
Start: 2021-08-26 | End: 2021-08-26 | Stop reason: HOSPADM

## 2021-08-26 RX ORDER — OXYCODONE HYDROCHLORIDE 5 MG/1
5 TABLET ORAL EVERY 4 HOURS PRN
Status: DISCONTINUED | OUTPATIENT
Start: 2021-08-26 | End: 2021-08-26 | Stop reason: HOSPADM

## 2021-08-26 RX ORDER — SODIUM CHLORIDE 0.9 % (FLUSH) 0.9 %
10 SYRINGE (ML) INJECTION AS NEEDED
Status: DISCONTINUED | OUTPATIENT
Start: 2021-08-26 | End: 2021-08-26 | Stop reason: HOSPADM

## 2021-08-26 RX ORDER — ACETAMINOPHEN 500 MG
1000 TABLET ORAL ONCE
Status: COMPLETED | OUTPATIENT
Start: 2021-08-26 | End: 2021-08-26

## 2021-08-26 RX ORDER — SODIUM CHLORIDE 9 MG/ML
9 INJECTION, SOLUTION INTRAVENOUS CONTINUOUS PRN
Status: DISCONTINUED | OUTPATIENT
Start: 2021-08-26 | End: 2021-08-26 | Stop reason: HOSPADM

## 2021-08-26 RX ORDER — PROPOFOL 10 MG/ML
VIAL (ML) INTRAVENOUS AS NEEDED
Status: DISCONTINUED | OUTPATIENT
Start: 2021-08-26 | End: 2021-08-26 | Stop reason: SURG

## 2021-08-26 RX ADMIN — ROCURONIUM BROMIDE 5 MG: 10 INJECTION INTRAVENOUS at 10:08

## 2021-08-26 RX ADMIN — HYDROMORPHONE HYDROCHLORIDE 0.5 MG: 1 INJECTION, SOLUTION INTRAMUSCULAR; INTRAVENOUS; SUBCUTANEOUS at 11:13

## 2021-08-26 RX ADMIN — LIDOCAINE HYDROCHLORIDE 100 MG: 20 INJECTION, SOLUTION INFILTRATION; PERINEURAL at 10:08

## 2021-08-26 RX ADMIN — ROCURONIUM BROMIDE 25 MG: 10 INJECTION INTRAVENOUS at 10:09

## 2021-08-26 RX ADMIN — ACETAMINOPHEN 1000 MG: 500 TABLET ORAL at 09:58

## 2021-08-26 RX ADMIN — HYDROCODONE BITARTRATE AND ACETAMINOPHEN 1 TABLET: 5; 325 TABLET ORAL at 13:20

## 2021-08-26 RX ADMIN — FENTANYL CITRATE 50 MCG: 50 INJECTION INTRAMUSCULAR; INTRAVENOUS at 10:18

## 2021-08-26 RX ADMIN — ONDANSETRON 4 MG: 2 INJECTION INTRAMUSCULAR; INTRAVENOUS at 10:41

## 2021-08-26 RX ADMIN — FENTANYL CITRATE 50 MCG: 50 INJECTION INTRAMUSCULAR; INTRAVENOUS at 10:03

## 2021-08-26 RX ADMIN — OXYCODONE HYDROCHLORIDE 5 MG: 5 TABLET ORAL at 11:18

## 2021-08-26 RX ADMIN — DEXAMETHASONE SODIUM PHOSPHATE 4 MG: 4 INJECTION INTRA-ARTICULAR; INTRALESIONAL; INTRAMUSCULAR; INTRAVENOUS; SOFT TISSUE at 10:00

## 2021-08-26 RX ADMIN — CEFAZOLIN SODIUM 2 G: 2 INJECTION, SOLUTION INTRAVENOUS at 10:03

## 2021-08-26 RX ADMIN — PROPOFOL 160 MG: 10 INJECTION, EMULSION INTRAVENOUS at 10:08

## 2021-08-26 RX ADMIN — OXYCODONE HYDROCHLORIDE 5 MG: 5 TABLET ORAL at 09:58

## 2021-08-26 RX ADMIN — MIDAZOLAM HYDROCHLORIDE 2 MG: 1 INJECTION, SOLUTION INTRAMUSCULAR; INTRAVENOUS at 10:00

## 2021-08-26 RX ADMIN — SUGAMMADEX 200 MG: 100 INJECTION, SOLUTION INTRAVENOUS at 10:41

## 2021-08-26 RX ADMIN — SODIUM CHLORIDE 9 ML/HR: 9 INJECTION, SOLUTION INTRAVENOUS at 09:09

## 2021-08-26 NOTE — ANESTHESIA PREPROCEDURE EVALUATION
Anesthesia Evaluation     Patient summary reviewed and Nursing notes reviewed   no history of anesthetic complications:  NPO Solid Status: > 8 hours  NPO Liquid Status: > 2 hours           Airway   Mallampati: II  TM distance: >3 FB  Neck ROM: full  No difficulty expected  Dental    (+) poor dentition    Comment: Denies loose    Pulmonary - negative pulmonary ROS and normal exam    breath sounds clear to auscultation  Cardiovascular - normal exam  Exercise tolerance: good (4-7 METS)    Rhythm: regular    (+) hypertension, past MI ,       Neuro/Psych- negative ROS  GI/Hepatic/Renal/Endo    (+)   renal disease,     Musculoskeletal (-) negative ROS    Abdominal    Substance History - negative use     OB/GYN negative ob/gyn ROS         Other - negative ROS                       Anesthesia Plan    ASA 4     general   (Patient understands anesthesia not responsible for dental damage.)  intravenous induction     Anesthetic plan, all risks, benefits, and alternatives have been provided, discussed and informed consent has been obtained with: patient.  Use of blood products discussed with patient .   Plan discussed with CRNA.

## 2021-08-26 NOTE — ANESTHESIA POSTPROCEDURE EVALUATION
Patient: Clarissa Augustin    Procedure Summary     Date: 08/26/21 Room / Location: [facility] OR 04 / [facility] MAIN OR    Anesthesia Start: 1000 Anesthesia Stop: 1050    Procedure: INSERTION PERITONEAL DIALYSIS CATHETER LAPAROSCOPIC (N/A Abdomen) Diagnosis:       ESRD (end stage renal disease) (CMS/HCC)      (ESRD (end stage renal disease) (CMS/HCC) [N18.6])    Surgeons: Miki Mendoza MD Provider: Sol Cruz DO    Anesthesia Type: general ASA Status: 4          Anesthesia Type: general    Vitals  Vitals Value Taken Time   /96 08/26/21 1122   Temp 36.8 °C (98.3 °F) 08/26/21 1055   Pulse 84 08/26/21 1124   Resp 16 08/26/21 1055   SpO2 93 % 08/26/21 1124   Vitals shown include unvalidated device data.        Post Anesthesia Care and Evaluation    Patient location during evaluation: bedside  Patient participation: complete - patient participated  Level of consciousness: awake  Pain management: adequate  Airway patency: patent  Anesthetic complications: No anesthetic complications  PONV Status: none  Cardiovascular status: acceptable and stable  Respiratory status: acceptable  Hydration status: acceptable    Comments: An Anesthesiologist personally participated in the most demanding procedures (including induction and emergence if applicable) in the anesthesia plan, monitored the course of anesthesia administration at frequent intervals and remained physically present and available for immediate diagnosis and treatment of emergencies.

## 2021-08-27 ENCOUNTER — TELEPHONE (OUTPATIENT)
Dept: SURGERY | Facility: CLINIC | Age: 56
End: 2021-08-27

## 2021-08-27 NOTE — TELEPHONE ENCOUNTER
I spoke to Bharti, and this would not be related to surgery, if site not hurting, not red or is not draining puss or look infected.  More pain medication would not be prescribed for this.  I called the patient and let her know.  At that time, she told me she was having some pain at the site, and clear drainage, but nothing that looks infected.  She stated she is going to try to go to the ER.

## 2021-08-27 NOTE — TELEPHONE ENCOUNTER
INSERTION PERITONEAL DIALYSIS CATHETER LAPAROSCOPIC 08/26.  Patient has tried taking one and two hydrocodone 5-325, and said it isn't helping.  She has not been able to sleep yet, and said her knees are both hurting.  I asked if she has pain at her surgery sight, and she told me she does not.

## 2021-08-29 ENCOUNTER — HOSPITAL ENCOUNTER (INPATIENT)
Facility: HOSPITAL | Age: 56
LOS: 10 days | Discharge: HOME | End: 2021-09-08
Attending: EMERGENCY MEDICINE | Admitting: INTERNAL MEDICINE

## 2021-08-29 DIAGNOSIS — D57.00 SICKLE CELL CRISIS (HCC): Primary | ICD-10-CM

## 2021-08-29 DIAGNOSIS — R26.2 DIFFICULTY WALKING: ICD-10-CM

## 2021-08-29 DIAGNOSIS — Z78.9 DECREASED ACTIVITIES OF DAILY LIVING (ADL): ICD-10-CM

## 2021-08-29 PROBLEM — Z99.2 ESRD (END STAGE RENAL DISEASE) ON DIALYSIS (HCC): Status: ACTIVE | Noted: 2021-08-17

## 2021-08-29 PROBLEM — I10 ESSENTIAL HYPERTENSION: Status: ACTIVE | Noted: 2021-08-29

## 2021-08-29 LAB
ALBUMIN SERPL-MCNC: 4.4 G/DL (ref 3.5–5.2)
ALBUMIN/GLOB SERPL: 1.4 G/DL
ALP SERPL-CCNC: 123 U/L (ref 39–117)
ALT SERPL W P-5'-P-CCNC: <5 U/L (ref 1–33)
ANION GAP SERPL CALCULATED.3IONS-SCNC: 21.2 MMOL/L (ref 5–15)
ANISOCYTOSIS BLD QL: ABNORMAL
AST SERPL-CCNC: 32 U/L (ref 1–32)
BILIRUB SERPL-MCNC: 0.8 MG/DL (ref 0–1.2)
BUN SERPL-MCNC: 94 MG/DL (ref 6–20)
BUN/CREAT SERPL: 7.6 (ref 7–25)
C3 FRG RBC-MCNC: ABNORMAL
CALCIUM SPEC-SCNC: 8.5 MG/DL (ref 8.6–10.5)
CHLORIDE SERPL-SCNC: 94 MMOL/L (ref 98–107)
CO2 SERPL-SCNC: 16.8 MMOL/L (ref 22–29)
CREAT SERPL-MCNC: 12.31 MG/DL (ref 0.57–1)
DACRYOCYTES BLD QL SMEAR: ABNORMAL
DEPRECATED RDW RBC AUTO: 48.7 FL (ref 37–54)
EOSINOPHIL # BLD MANUAL: 0.5 10*3/MM3 (ref 0–0.4)
EOSINOPHIL NFR BLD MANUAL: 5 % (ref 0.3–6.2)
ERYTHROCYTE [DISTWIDTH] IN BLOOD BY AUTOMATED COUNT: 15.9 % (ref 12.3–15.4)
GFR SERPL CREATININE-BSD FRML MDRD: 4 ML/MIN/1.73
GFR SERPL CREATININE-BSD FRML MDRD: ABNORMAL ML/MIN/{1.73_M2}
GLOBULIN UR ELPH-MCNC: 3.2 GM/DL
GLUCOSE SERPL-MCNC: 111 MG/DL (ref 65–99)
HCT VFR BLD AUTO: 21.9 % (ref 34–46.6)
HGB BLD-MCNC: 8.2 G/DL (ref 12–15.9)
HOLD SPECIMEN: NORMAL
HOLD SPECIMEN: NORMAL
LIPASE SERPL-CCNC: 29 U/L (ref 13–60)
LYMPHOCYTES # BLD MANUAL: 2.6 10*3/MM3 (ref 0.7–3.1)
LYMPHOCYTES NFR BLD MANUAL: 26 % (ref 19.6–45.3)
LYMPHOCYTES NFR BLD MANUAL: 3 % (ref 5–12)
MACROCYTES BLD QL SMEAR: ABNORMAL
MCH RBC QN AUTO: 31.7 PG (ref 26.6–33)
MCHC RBC AUTO-ENTMCNC: 37.4 G/DL (ref 31.5–35.7)
MCV RBC AUTO: 84.6 FL (ref 79–97)
MICROCYTES BLD QL: ABNORMAL
MONOCYTES # BLD AUTO: 0.3 10*3/MM3 (ref 0.1–0.9)
NEUTROPHILS # BLD AUTO: 6.6 10*3/MM3 (ref 1.7–7)
NEUTROPHILS NFR BLD MANUAL: 65 % (ref 42.7–76)
NEUTS BAND NFR BLD MANUAL: 1 % (ref 0–5)
NRBC SPEC MANUAL: 13 /100 WBC (ref 0–0.2)
PLATELET # BLD AUTO: 199 10*3/MM3 (ref 140–450)
PMV BLD AUTO: 10.9 FL (ref 6–12)
POIKILOCYTOSIS BLD QL SMEAR: ABNORMAL
POTASSIUM SERPL-SCNC: 5.8 MMOL/L (ref 3.5–5.2)
PROT SERPL-MCNC: 7.6 G/DL (ref 6–8.5)
RBC # BLD AUTO: 2.59 10*6/MM3 (ref 3.77–5.28)
RETICS # AUTO: 0.06 10*6/MM3 (ref 0.02–0.13)
RETICS/RBC NFR AUTO: 2.21 % (ref 0.7–1.9)
SICKLE CELLS: ABNORMAL
SMALL PLATELETS BLD QL SMEAR: ADEQUATE
SODIUM SERPL-SCNC: 132 MMOL/L (ref 136–145)
TARGETS BLD QL SMEAR: ABNORMAL
WBC # BLD AUTO: 10 10*3/MM3 (ref 3.4–10.8)
WBC MORPH BLD: NORMAL
WHOLE BLOOD HOLD SPECIMEN: NORMAL
WHOLE BLOOD HOLD SPECIMEN: NORMAL

## 2021-08-29 PROCEDURE — 85045 AUTOMATED RETICULOCYTE COUNT: CPT | Performed by: EMERGENCY MEDICINE

## 2021-08-29 PROCEDURE — 25010000002 HYDROMORPHONE 1 MG/ML SOLUTION: Performed by: EMERGENCY MEDICINE

## 2021-08-29 PROCEDURE — 25010000002 ONDANSETRON PER 1 MG: Performed by: EMERGENCY MEDICINE

## 2021-08-29 PROCEDURE — 25010000002 ENOXAPARIN PER 10 MG: Performed by: INTERNAL MEDICINE

## 2021-08-29 PROCEDURE — 80053 COMPREHEN METABOLIC PANEL: CPT | Performed by: EMERGENCY MEDICINE

## 2021-08-29 PROCEDURE — 83690 ASSAY OF LIPASE: CPT | Performed by: EMERGENCY MEDICINE

## 2021-08-29 PROCEDURE — 99283 EMERGENCY DEPT VISIT LOW MDM: CPT

## 2021-08-29 PROCEDURE — 25010000002 DIPHENHYDRAMINE PER 50 MG: Performed by: NURSE PRACTITIONER

## 2021-08-29 PROCEDURE — 25010000002 HYDROMORPHONE 1 MG/ML SOLUTION: Performed by: INTERNAL MEDICINE

## 2021-08-29 PROCEDURE — 85007 BL SMEAR W/DIFF WBC COUNT: CPT | Performed by: EMERGENCY MEDICINE

## 2021-08-29 PROCEDURE — 5A1D70Z PERFORMANCE OF URINARY FILTRATION, INTERMITTENT, LESS THAN 6 HOURS PER DAY: ICD-10-PCS | Performed by: INTERNAL MEDICINE

## 2021-08-29 PROCEDURE — 25010000002 HALOPERIDOL LACTATE PER 5 MG: Performed by: NURSE PRACTITIONER

## 2021-08-29 PROCEDURE — 85025 COMPLETE CBC W/AUTO DIFF WBC: CPT | Performed by: EMERGENCY MEDICINE

## 2021-08-29 RX ORDER — DIPHENHYDRAMINE HYDROCHLORIDE 50 MG/ML
25 INJECTION INTRAMUSCULAR; INTRAVENOUS ONCE
Status: COMPLETED | OUTPATIENT
Start: 2021-08-29 | End: 2021-08-29

## 2021-08-29 RX ORDER — HYDROCODONE BITARTRATE AND ACETAMINOPHEN 5; 325 MG/1; MG/1
1 TABLET ORAL EVERY 4 HOURS PRN
Status: DISPENSED | OUTPATIENT
Start: 2021-08-29 | End: 2021-09-05

## 2021-08-29 RX ORDER — BISACODYL 5 MG/1
5 TABLET, DELAYED RELEASE ORAL DAILY PRN
Status: DISCONTINUED | OUTPATIENT
Start: 2021-08-29 | End: 2021-09-08 | Stop reason: HOSPADM

## 2021-08-29 RX ORDER — ACETAMINOPHEN 325 MG/1
650 TABLET ORAL EVERY 4 HOURS PRN
Status: DISCONTINUED | OUTPATIENT
Start: 2021-08-29 | End: 2021-08-29 | Stop reason: SDUPTHER

## 2021-08-29 RX ORDER — DIPHENHYDRAMINE HCL 25 MG
25 CAPSULE ORAL EVERY 6 HOURS PRN
Status: DISCONTINUED | OUTPATIENT
Start: 2021-08-29 | End: 2021-08-30

## 2021-08-29 RX ORDER — DIPHENHYDRAMINE HYDROCHLORIDE 50 MG/ML
25 INJECTION INTRAMUSCULAR; INTRAVENOUS EVERY 6 HOURS PRN
Status: DISCONTINUED | OUTPATIENT
Start: 2021-08-29 | End: 2021-09-08 | Stop reason: HOSPADM

## 2021-08-29 RX ORDER — HALOPERIDOL 5 MG/ML
5 INJECTION INTRAMUSCULAR ONCE
Status: COMPLETED | OUTPATIENT
Start: 2021-08-29 | End: 2021-08-29

## 2021-08-29 RX ORDER — BISACODYL 10 MG
10 SUPPOSITORY, RECTAL RECTAL DAILY PRN
Status: DISCONTINUED | OUTPATIENT
Start: 2021-08-29 | End: 2021-09-08 | Stop reason: HOSPADM

## 2021-08-29 RX ORDER — NITROGLYCERIN 0.4 MG/1
0.4 TABLET SUBLINGUAL
Status: DISCONTINUED | OUTPATIENT
Start: 2021-08-29 | End: 2021-09-08 | Stop reason: HOSPADM

## 2021-08-29 RX ORDER — POLYETHYLENE GLYCOL 3350 17 G/17G
17 POWDER, FOR SOLUTION ORAL DAILY PRN
Status: DISCONTINUED | OUTPATIENT
Start: 2021-08-29 | End: 2021-09-08 | Stop reason: HOSPADM

## 2021-08-29 RX ORDER — ONDANSETRON 2 MG/ML
4 INJECTION INTRAMUSCULAR; INTRAVENOUS EVERY 6 HOURS PRN
Status: DISCONTINUED | OUTPATIENT
Start: 2021-08-29 | End: 2021-09-08 | Stop reason: HOSPADM

## 2021-08-29 RX ORDER — ACETAMINOPHEN 160 MG/5ML
650 SOLUTION ORAL EVERY 4 HOURS PRN
Status: DISCONTINUED | OUTPATIENT
Start: 2021-08-29 | End: 2021-09-08 | Stop reason: HOSPADM

## 2021-08-29 RX ORDER — ACETAMINOPHEN 650 MG/1
650 SUPPOSITORY RECTAL EVERY 4 HOURS PRN
Status: DISCONTINUED | OUTPATIENT
Start: 2021-08-29 | End: 2021-09-08 | Stop reason: HOSPADM

## 2021-08-29 RX ORDER — ONDANSETRON 2 MG/ML
4 INJECTION INTRAMUSCULAR; INTRAVENOUS ONCE
Status: COMPLETED | OUTPATIENT
Start: 2021-08-29 | End: 2021-08-29

## 2021-08-29 RX ORDER — CHOLECALCIFEROL (VITAMIN D3) 125 MCG
5 CAPSULE ORAL NIGHTLY PRN
Status: DISCONTINUED | OUTPATIENT
Start: 2021-08-29 | End: 2021-09-08 | Stop reason: HOSPADM

## 2021-08-29 RX ORDER — FAMOTIDINE 20 MG/1
40 TABLET, FILM COATED ORAL DAILY
Status: DISCONTINUED | OUTPATIENT
Start: 2021-08-29 | End: 2021-09-07

## 2021-08-29 RX ORDER — SODIUM CHLORIDE 0.9 % (FLUSH) 0.9 %
10 SYRINGE (ML) INJECTION AS NEEDED
Status: DISCONTINUED | OUTPATIENT
Start: 2021-08-29 | End: 2021-09-08 | Stop reason: HOSPADM

## 2021-08-29 RX ORDER — ALPRAZOLAM 0.25 MG/1
0.5 TABLET ORAL EVERY 8 HOURS PRN
Status: DISPENSED | OUTPATIENT
Start: 2021-08-29 | End: 2021-09-05

## 2021-08-29 RX ORDER — AMOXICILLIN 250 MG
2 CAPSULE ORAL 2 TIMES DAILY
Status: DISCONTINUED | OUTPATIENT
Start: 2021-08-29 | End: 2021-09-08 | Stop reason: HOSPADM

## 2021-08-29 RX ORDER — ALUMINA, MAGNESIA, AND SIMETHICONE 2400; 2400; 240 MG/30ML; MG/30ML; MG/30ML
15 SUSPENSION ORAL EVERY 6 HOURS PRN
Status: DISCONTINUED | OUTPATIENT
Start: 2021-08-29 | End: 2021-09-08 | Stop reason: HOSPADM

## 2021-08-29 RX ORDER — HYDROCODONE BITARTRATE AND ACETAMINOPHEN 10; 325 MG/1; MG/1
1 TABLET ORAL EVERY 4 HOURS PRN
Status: DISPENSED | OUTPATIENT
Start: 2021-08-29 | End: 2021-09-05

## 2021-08-29 RX ORDER — HEPARIN SODIUM 1000 [USP'U]/ML
4000 INJECTION, SOLUTION INTRAVENOUS; SUBCUTANEOUS AS NEEDED
Status: DISCONTINUED | OUTPATIENT
Start: 2021-08-29 | End: 2021-09-08 | Stop reason: HOSPADM

## 2021-08-29 RX ORDER — ACETAMINOPHEN 325 MG/1
650 TABLET ORAL EVERY 4 HOURS PRN
Status: DISCONTINUED | OUTPATIENT
Start: 2021-08-29 | End: 2021-09-08 | Stop reason: HOSPADM

## 2021-08-29 RX ORDER — NALOXONE HCL 0.4 MG/ML
0.4 VIAL (ML) INJECTION
Status: DISCONTINUED | OUTPATIENT
Start: 2021-08-29 | End: 2021-09-08 | Stop reason: HOSPADM

## 2021-08-29 RX ADMIN — DOCUSATE SODIUM 50MG AND SENNOSIDES 8.6MG 2 TABLET: 8.6; 5 TABLET, FILM COATED ORAL at 20:41

## 2021-08-29 RX ADMIN — HYDROMORPHONE HYDROCHLORIDE 0.5 MG: 1 INJECTION, SOLUTION INTRAMUSCULAR; INTRAVENOUS; SUBCUTANEOUS at 20:41

## 2021-08-29 RX ADMIN — ENOXAPARIN SODIUM 30 MG: 30 INJECTION, SOLUTION INTRAVENOUS; SUBCUTANEOUS at 13:32

## 2021-08-29 RX ADMIN — DIPHENHYDRAMINE HYDROCHLORIDE 25 MG: 50 INJECTION INTRAMUSCULAR; INTRAVENOUS at 09:53

## 2021-08-29 RX ADMIN — HYDROMORPHONE HYDROCHLORIDE 1 MG: 1 INJECTION, SOLUTION INTRAMUSCULAR; INTRAVENOUS; SUBCUTANEOUS at 07:46

## 2021-08-29 RX ADMIN — HYDROMORPHONE HYDROCHLORIDE 0.5 MG: 1 INJECTION, SOLUTION INTRAMUSCULAR; INTRAVENOUS; SUBCUTANEOUS at 17:49

## 2021-08-29 RX ADMIN — ONDANSETRON 4 MG: 2 INJECTION INTRAMUSCULAR; INTRAVENOUS at 07:45

## 2021-08-29 RX ADMIN — HYDROCODONE BITARTRATE AND ACETAMINOPHEN 1 TABLET: 5; 325 TABLET ORAL at 13:32

## 2021-08-29 RX ADMIN — DOCUSATE SODIUM 50MG AND SENNOSIDES 8.6MG 2 TABLET: 8.6; 5 TABLET, FILM COATED ORAL at 13:33

## 2021-08-29 RX ADMIN — ALPRAZOLAM 0.5 MG: 0.25 TABLET ORAL at 15:54

## 2021-08-29 RX ADMIN — HALOPERIDOL LACTATE 5 MG: 5 INJECTION, SOLUTION INTRAMUSCULAR at 09:53

## 2021-08-29 RX ADMIN — HYDROMORPHONE HYDROCHLORIDE 1 MG: 1 INJECTION, SOLUTION INTRAMUSCULAR; INTRAVENOUS; SUBCUTANEOUS at 08:36

## 2021-08-29 RX ADMIN — SODIUM CHLORIDE 500 ML: 9 INJECTION, SOLUTION INTRAVENOUS at 07:41

## 2021-08-29 RX ADMIN — FAMOTIDINE 40 MG: 20 TABLET ORAL at 13:32

## 2021-08-30 LAB
ALBUMIN SERPL-MCNC: 4 G/DL (ref 3.5–5.2)
ALBUMIN/GLOB SERPL: 1.2 G/DL
ALP SERPL-CCNC: 134 U/L (ref 39–117)
ALT SERPL W P-5'-P-CCNC: <5 U/L (ref 1–33)
ANION GAP SERPL CALCULATED.3IONS-SCNC: 24.5 MMOL/L (ref 5–15)
AST SERPL-CCNC: 34 U/L (ref 1–32)
BILIRUB SERPL-MCNC: 1.6 MG/DL (ref 0–1.2)
BUN SERPL-MCNC: 66 MG/DL (ref 6–20)
BUN/CREAT SERPL: 7.6 (ref 7–25)
CALCIUM SPEC-SCNC: 8.9 MG/DL (ref 8.6–10.5)
CHLORIDE SERPL-SCNC: 96 MMOL/L (ref 98–107)
CO2 SERPL-SCNC: 11.5 MMOL/L (ref 22–29)
CREAT SERPL-MCNC: 8.69 MG/DL (ref 0.57–1)
DEPRECATED RDW RBC AUTO: 51.8 FL (ref 37–54)
ERYTHROCYTE [DISTWIDTH] IN BLOOD BY AUTOMATED COUNT: 17.2 % (ref 12.3–15.4)
GFR SERPL CREATININE-BSD FRML MDRD: 6 ML/MIN/1.73
GFR SERPL CREATININE-BSD FRML MDRD: ABNORMAL ML/MIN/{1.73_M2}
GLOBULIN UR ELPH-MCNC: 3.3 GM/DL
GLUCOSE SERPL-MCNC: 79 MG/DL (ref 65–99)
HCT VFR BLD AUTO: 17.4 % (ref 34–46.6)
HGB BLD-MCNC: 6.5 G/DL (ref 12–15.9)
MCH RBC QN AUTO: 31.3 PG (ref 26.6–33)
MCHC RBC AUTO-ENTMCNC: 37.4 G/DL (ref 31.5–35.7)
MCV RBC AUTO: 83.7 FL (ref 79–97)
PLATELET # BLD AUTO: 191 10*3/MM3 (ref 140–450)
PMV BLD AUTO: 10.7 FL (ref 6–12)
POTASSIUM SERPL-SCNC: 6 MMOL/L (ref 3.5–5.2)
PROT SERPL-MCNC: 7.3 G/DL (ref 6–8.5)
RBC # BLD AUTO: 2.08 10*6/MM3 (ref 3.77–5.28)
SODIUM SERPL-SCNC: 132 MMOL/L (ref 136–145)
WBC # BLD AUTO: 13.14 10*3/MM3 (ref 3.4–10.8)

## 2021-08-30 PROCEDURE — 25010000002 HYDROMORPHONE 1 MG/ML SOLUTION: Performed by: INTERNAL MEDICINE

## 2021-08-30 PROCEDURE — 85027 COMPLETE CBC AUTOMATED: CPT | Performed by: INTERNAL MEDICINE

## 2021-08-30 PROCEDURE — 87340 HEPATITIS B SURFACE AG IA: CPT | Performed by: INTERNAL MEDICINE

## 2021-08-30 PROCEDURE — 80053 COMPREHEN METABOLIC PANEL: CPT | Performed by: INTERNAL MEDICINE

## 2021-08-30 PROCEDURE — 25010000002 ENOXAPARIN PER 10 MG: Performed by: INTERNAL MEDICINE

## 2021-08-30 PROCEDURE — 36415 COLL VENOUS BLD VENIPUNCTURE: CPT | Performed by: INTERNAL MEDICINE

## 2021-08-30 RX ORDER — DIPHENHYDRAMINE HCL 25 MG
25 CAPSULE ORAL EVERY 6 HOURS PRN
Status: DISCONTINUED | OUTPATIENT
Start: 2021-08-30 | End: 2021-09-08 | Stop reason: HOSPADM

## 2021-08-30 RX ORDER — ACETAMINOPHEN 325 MG/1
650 TABLET ORAL EVERY 4 HOURS PRN
Status: DISCONTINUED | OUTPATIENT
Start: 2021-08-30 | End: 2021-09-08 | Stop reason: HOSPADM

## 2021-08-30 RX ADMIN — ALPRAZOLAM 0.5 MG: 0.25 TABLET ORAL at 04:53

## 2021-08-30 RX ADMIN — FAMOTIDINE 40 MG: 20 TABLET ORAL at 08:48

## 2021-08-30 RX ADMIN — HYDROMORPHONE HYDROCHLORIDE 0.5 MG: 1 INJECTION, SOLUTION INTRAMUSCULAR; INTRAVENOUS; SUBCUTANEOUS at 09:00

## 2021-08-30 RX ADMIN — ENOXAPARIN SODIUM 30 MG: 30 INJECTION, SOLUTION INTRAVENOUS; SUBCUTANEOUS at 13:02

## 2021-08-30 RX ADMIN — HYDROCODONE BITARTRATE AND ACETAMINOPHEN 1 TABLET: 10; 325 TABLET ORAL at 04:53

## 2021-08-30 RX ADMIN — HYDROMORPHONE HYDROCHLORIDE 0.5 MG: 1 INJECTION, SOLUTION INTRAMUSCULAR; INTRAVENOUS; SUBCUTANEOUS at 02:28

## 2021-08-31 PROBLEM — E87.5 HYPERKALEMIA: Status: ACTIVE | Noted: 2021-08-31

## 2021-08-31 LAB — HBV SURFACE AG SERPL QL IA: NORMAL

## 2021-08-31 PROCEDURE — 25010000002 ENOXAPARIN PER 10 MG: Performed by: INTERNAL MEDICINE

## 2021-08-31 RX ORDER — NITROGLYCERIN 0.4 MG/1
0.4 TABLET SUBLINGUAL
Status: CANCELLED | OUTPATIENT
Start: 2021-08-31

## 2021-08-31 RX ORDER — DIPHENHYDRAMINE HCL 25 MG
25 CAPSULE ORAL EVERY 6 HOURS PRN
Status: CANCELLED | OUTPATIENT
Start: 2021-08-31

## 2021-08-31 RX ORDER — DIPHENHYDRAMINE HYDROCHLORIDE 50 MG/ML
25 INJECTION INTRAMUSCULAR; INTRAVENOUS EVERY 6 HOURS PRN
Status: CANCELLED | OUTPATIENT
Start: 2021-08-31

## 2021-08-31 RX ORDER — ACETAMINOPHEN 325 MG/1
650 TABLET ORAL EVERY 4 HOURS PRN
Status: CANCELLED | OUTPATIENT
Start: 2021-08-31

## 2021-08-31 RX ADMIN — FAMOTIDINE 40 MG: 20 TABLET ORAL at 08:12

## 2021-08-31 RX ADMIN — DOCUSATE SODIUM 50MG AND SENNOSIDES 8.6MG 2 TABLET: 8.6; 5 TABLET, FILM COATED ORAL at 23:14

## 2021-08-31 RX ADMIN — HYDROCODONE BITARTRATE AND ACETAMINOPHEN 1 TABLET: 10; 325 TABLET ORAL at 09:07

## 2021-08-31 RX ADMIN — HYDROCODONE BITARTRATE AND ACETAMINOPHEN 1 TABLET: 10; 325 TABLET ORAL at 03:20

## 2021-08-31 RX ADMIN — ENOXAPARIN SODIUM 30 MG: 30 INJECTION, SOLUTION INTRAVENOUS; SUBCUTANEOUS at 13:33

## 2021-08-31 RX ADMIN — DOCUSATE SODIUM 50MG AND SENNOSIDES 8.6MG 2 TABLET: 8.6; 5 TABLET, FILM COATED ORAL at 08:12

## 2021-09-01 ENCOUNTER — APPOINTMENT (OUTPATIENT)
Dept: CT IMAGING | Facility: HOSPITAL | Age: 56
End: 2021-09-01

## 2021-09-01 PROCEDURE — 25010000002 HYDROMORPHONE 1 MG/ML SOLUTION: Performed by: INTERNAL MEDICINE

## 2021-09-01 PROCEDURE — 25010000002 ONDANSETRON PER 1 MG: Performed by: INTERNAL MEDICINE

## 2021-09-01 PROCEDURE — 70450 CT HEAD/BRAIN W/O DYE: CPT

## 2021-09-01 PROCEDURE — 25010000002 ENOXAPARIN PER 10 MG: Performed by: INTERNAL MEDICINE

## 2021-09-01 RX ADMIN — HYDROMORPHONE HYDROCHLORIDE 0.5 MG: 1 INJECTION, SOLUTION INTRAMUSCULAR; INTRAVENOUS; SUBCUTANEOUS at 23:13

## 2021-09-01 RX ADMIN — ENOXAPARIN SODIUM 30 MG: 30 INJECTION, SOLUTION INTRAVENOUS; SUBCUTANEOUS at 12:24

## 2021-09-01 RX ADMIN — FAMOTIDINE 40 MG: 20 TABLET ORAL at 12:25

## 2021-09-01 RX ADMIN — ALPRAZOLAM 0.5 MG: 0.25 TABLET ORAL at 23:13

## 2021-09-01 RX ADMIN — DOCUSATE SODIUM 50MG AND SENNOSIDES 8.6MG 2 TABLET: 8.6; 5 TABLET, FILM COATED ORAL at 20:01

## 2021-09-01 RX ADMIN — ONDANSETRON 4 MG: 2 INJECTION INTRAMUSCULAR; INTRAVENOUS at 18:15

## 2021-09-01 RX ADMIN — HYDROCODONE BITARTRATE AND ACETAMINOPHEN 1 TABLET: 10; 325 TABLET ORAL at 20:01

## 2021-09-01 NOTE — PAYOR COMM NOTE
"ADMITTED 8/29  ONLY HAS WELLCARE NO OTHER INSURANCE. PLEASE REVIEW FOR ADDITIONAL DAYS  REF # [id number]      Joselin Jimenes (55 y.o. Female)     Date of Birth Social Security Number Address Home Phone MRN    1965  [address]  [place] [address] [phone] [id number]    Buddhist Marital Status          Unknown Single       Admission Date Admission Type Admitting Provider Attending Provider Department, Room/Bed    8/29/21 Emergency Greg Daniels MD Bhatti, Khalid M, MD [address] AMBULATORY SERVICES, 354/1    Discharge Date Discharge Disposition Discharge Destination                       Attending Provider: Greg Daniels MD    Allergies: Latex    Isolation: None   Infection: None   Code Status: CPR    Ht: 167.6 cm (66\")   Wt: 55.5 kg (122 lb 5.7 oz)    Admission Cmt: None   Principal Problem: None                Active Insurance as of 8/29/2021     Primary Coverage     Payor Plan Insurance Group Employer/Plan Group    [employer] [place] [employer]ICA[employer]      Payor Plan Address Payor Plan Phone Number Payor Plan Fax Number Effective Dates    [address] [address] [phone]  8/17/2021 - None Entered    [facility] [id number]       Subscriber Name Subscriber Birth Date Member ID       JOSELIN JIMENES 1965 [id number]                 Emergency Contacts      (Rel.) Home Phone Work Phone Mobile Phone    monika preciado (Sister) -- -- [phone]    RENETTA LIZAMA (Relative) -- -- [phone]    AVANI VILLARREAL (Friend) -- -- [phone]              "

## 2021-09-01 NOTE — PLAN OF CARE
Goal Outcome Evaluation:  Plan of Care Reviewed With: patient        Progress: no change  Outcome Summary: Pt had unwitnessed fall this shift, Head CT ordered & completed, bed alarm in use and alarming multiple times this shift. Multiple attempts to educate safe transfers to bedside commode which includes calling staff for assistance and allowing staff to assist to bedside commode.

## 2021-09-01 NOTE — NURSING NOTE
Pt found on floor by charge RN. Pt states she was trying to stand up. States she hit her elbow. 2 person assist to bed after VS taken. Provider and house supervisor aware. Orders received. Pt sister notified.

## 2021-09-01 NOTE — PROGRESS NOTES
[facility]     Progress Note    Patient Name: Clarissa Augustin  : 1965  MRN: [id number]  Primary Care Physician:  Mirian Caicedo  Date of admission: 2021    Subjective   Patient apparently fell on the floor last night and CT of the head was done which is unremarkable  Patient is very unsteady and weak  Blood pressure is stable  She appears to be little bit confused  Review of Systems  Constitutional:       Weakness tiredness fatigue  Eyes:                      No blurry vision, eye discharge, eye irritation, eye pain  HEENT:                  No acute hair loss, earache and discharge, nasal congestion or discharge, sore throat, postnasal drip  Respiratory:          No shortness of breath coughing sputum production wheezing hemoptysis pleuritic chest pain  Cardiovascular:    No chest pain, orthopnea, PND, dizziness, palpitation, lower extremity edema  Gastrointestinal:  No nausea vomiting diarrhea abdominal pain constipation  Genitourinary:      No urinary incontinence, hesitancy, frequency, urgency, dysuria  Neurological:       No confusion, headache, focal weakness, numbness, dysphasia  Hematologic:        No bruising, bleeding, pallor, lymphadenopathy  Endocrine:           No coldness, hot flashes, polyuria, abnormal hair growth  Musculoskeletal:   No body pains, aches, arthritic pains, muscle pain ,muscle wasting  Psychiatric:         No low or high mood, anxiety, hallucinations, delusions  Skin.                     No rash, ulcers, bruising, itching    Objective   Objective     Vitals:   Temp:  [97 °F (36.1 °C)-99.7 °F (37.6 °C)] 97.3 °F (36.3 °C)  Heart Rate:  [] 103  Resp:  [16-20] 16  BP: (136-181)/(61-87) 144/61  Physical Exam    Constitutional:        Awake, alert responsive, conversant, no obvious distress   Eyes:                      PERRLA, sclerae anicteric, no conjunctival injection   HEENT:                  Moist mucous membranes, no nasal or eye discharge, no  throat congestion   Neck:                     Supple, no thyromegaly, no lymphadenopathy, trachea midline, no elevated JVD   Respiratory:          Clear to auscultation bilaterally, nonlabored respirations    Cardiovascular:    RRR, no murmurs, rubs, or gallops, palpable pedal pulses bilaterally,No bilateral ankle edema   Gastrointestinal:  Positive bowel sounds, soft, nontender, non distended, no organomegaly   Musculoskeletal: , no clubbing or cyanosis to extremities,muscle wasting, joint swelling, muscle weakness   Psychiatric:             Appropriate affect, cooperative   Neurologic:           Awake alert ,oriented x 3, strength symmetric in all extremities, Cranial Nerves grossly intact to confrontation, speech clear   Skin:                     No rashes , bruising's, skin ulcers, petechiae or ecchymosis    Result Review    Result Review:  I have personally reviewed the results from the time of this admission to 9/1/2021 09:29 EDT and agree with these findings:  []  Laboratory  []  Microbiology  []  Radiology  []  EKG/Telemetry   []  Cardiology/Vascular   []  Pathology  []  Old records  []  Other:    Assessment/Plan   Assessment / Plan       Active Hospital Problems:  Active Hospital Problems    Diagnosis    • Hyperkalemia    • Sickle cell crisis (CMS/HCC)    • Essential hypertension    • ESRD (end stage renal disease) on dialysis (CMS/[facility])        Plan:   Labs tomorrow morning  If patient is feeling better then can be discharged home tomorrow  Sickle cell crisis is coming under control  If hemoglobin is less than 6 may benefit from 1 unit of blood       Electronically signed by Greg Daniels MD, 09/01/21, 9:29 AM EDT.

## 2021-09-01 NOTE — NURSING NOTE
Pt setting off alarm multiple times since falling, instructed multiple times to use call light, and wait for staff assistance. Pt verbalize understanding but continues to get up to side of bed setting bed alarm off, or even ambulating to bedside commode when alarm set off and refusing to wait for staff assistance. 2 person assistance required used to get pt to bedside commode as pt acts as if legs are buckling underneath her. Continuing to re-educate pt.

## 2021-09-02 ENCOUNTER — APPOINTMENT (OUTPATIENT)
Dept: CT IMAGING | Facility: HOSPITAL | Age: 56
End: 2021-09-02

## 2021-09-02 LAB
ALBUMIN SERPL-MCNC: 4 G/DL (ref 3.5–5.2)
ALBUMIN/GLOB SERPL: 1.1 G/DL
ALP SERPL-CCNC: 144 U/L (ref 39–117)
ALT SERPL W P-5'-P-CCNC: 7 U/L (ref 1–33)
ANION GAP SERPL CALCULATED.3IONS-SCNC: 14.4 MMOL/L (ref 5–15)
ANISOCYTOSIS BLD QL: ABNORMAL
AST SERPL-CCNC: 40 U/L (ref 1–32)
BASOPHILS # BLD MANUAL: 0.12 10*3/MM3 (ref 0–0.2)
BASOPHILS NFR BLD AUTO: 1 % (ref 0–1.5)
BILIRUB SERPL-MCNC: 2.5 MG/DL (ref 0–1.2)
BUN SERPL-MCNC: 44 MG/DL (ref 6–20)
BUN/CREAT SERPL: 7.3 (ref 7–25)
CALCIUM SPEC-SCNC: 9.1 MG/DL (ref 8.6–10.5)
CHLORIDE SERPL-SCNC: 100 MMOL/L (ref 98–107)
CO2 SERPL-SCNC: 20.6 MMOL/L (ref 22–29)
CREAT SERPL-MCNC: 6.01 MG/DL (ref 0.57–1)
DEPRECATED RDW RBC AUTO: 57.6 FL (ref 37–54)
DEPRECATED RDW RBC AUTO: 58.2 FL (ref 37–54)
EOSINOPHIL # BLD MANUAL: 0.46 10*3/MM3 (ref 0–0.4)
EOSINOPHIL NFR BLD MANUAL: 4 % (ref 0.3–6.2)
ERYTHROCYTE [DISTWIDTH] IN BLOOD BY AUTOMATED COUNT: 20.4 % (ref 12.3–15.4)
ERYTHROCYTE [DISTWIDTH] IN BLOOD BY AUTOMATED COUNT: 20.6 % (ref 12.3–15.4)
GFR SERPL CREATININE-BSD FRML MDRD: 9 ML/MIN/1.73
GFR SERPL CREATININE-BSD FRML MDRD: ABNORMAL ML/MIN/{1.73_M2}
GLOBULIN UR ELPH-MCNC: 3.5 GM/DL
GLUCOSE SERPL-MCNC: 146 MG/DL (ref 65–99)
HCT VFR BLD AUTO: 15 % (ref 34–46.6)
HCT VFR BLD AUTO: 17.4 % (ref 34–46.6)
HGB BLD-MCNC: 5.4 G/DL (ref 12–15.9)
HGB BLD-MCNC: 6.2 G/DL (ref 12–15.9)
HYPOCHROMIA BLD QL: ABNORMAL
LYMPHOCYTES # BLD MANUAL: 2.42 10*3/MM3 (ref 0.7–3.1)
LYMPHOCYTES NFR BLD MANUAL: 21 % (ref 19.6–45.3)
LYMPHOCYTES NFR BLD MANUAL: 4 % (ref 5–12)
MCH RBC QN AUTO: 30.4 PG (ref 26.6–33)
MCH RBC QN AUTO: 30.5 PG (ref 26.6–33)
MCHC RBC AUTO-ENTMCNC: 35.6 G/DL (ref 31.5–35.7)
MCHC RBC AUTO-ENTMCNC: 36 G/DL (ref 31.5–35.7)
MCV RBC AUTO: 84.7 FL (ref 79–97)
MCV RBC AUTO: 85.3 FL (ref 79–97)
MICROCYTES BLD QL: ABNORMAL
MONOCYTES # BLD AUTO: 0.46 10*3/MM3 (ref 0.1–0.9)
NEUTROPHILS # BLD AUTO: 8.08 10*3/MM3 (ref 1.7–7)
NEUTROPHILS NFR BLD MANUAL: 70 % (ref 42.7–76)
NRBC SPEC MANUAL: 124 /100 WBC (ref 0–0.2)
OVALOCYTES BLD QL SMEAR: ABNORMAL
PATHOLOGY REVIEW: YES
PLAT MORPH BLD: NORMAL
PLATELET # BLD AUTO: 231 10*3/MM3 (ref 140–450)
PLATELET # BLD AUTO: 233 10*3/MM3 (ref 140–450)
PMV BLD AUTO: 10.4 FL (ref 6–12)
PMV BLD AUTO: 10.7 FL (ref 6–12)
POIKILOCYTOSIS BLD QL SMEAR: ABNORMAL
POLYCHROMASIA BLD QL SMEAR: ABNORMAL
POTASSIUM SERPL-SCNC: 4.7 MMOL/L (ref 3.5–5.2)
PROT SERPL-MCNC: 7.5 G/DL (ref 6–8.5)
RBC # BLD AUTO: 1.77 10*6/MM3 (ref 3.77–5.28)
RBC # BLD AUTO: 2.04 10*6/MM3 (ref 3.77–5.28)
SCHISTOCYTES BLD QL SMEAR: ABNORMAL
SICKLE CELLS: ABNORMAL
SODIUM SERPL-SCNC: 135 MMOL/L (ref 136–145)
TARGETS BLD QL SMEAR: ABNORMAL
WBC # BLD AUTO: 10.94 10*3/MM3 (ref 3.4–10.8)
WBC # BLD AUTO: 11.54 10*3/MM3 (ref 3.4–10.8)
WBC MORPH BLD: NORMAL

## 2021-09-02 PROCEDURE — 85007 BL SMEAR W/DIFF WBC COUNT: CPT | Performed by: INTERNAL MEDICINE

## 2021-09-02 PROCEDURE — 86901 BLOOD TYPING SEROLOGIC RH(D): CPT

## 2021-09-02 PROCEDURE — 86900 BLOOD TYPING SEROLOGIC ABO: CPT

## 2021-09-02 PROCEDURE — 74176 CT ABD & PELVIS W/O CONTRAST: CPT

## 2021-09-02 PROCEDURE — 74176 CT ABD & PELVIS W/O CONTRAST: CPT | Performed by: RADIOLOGY

## 2021-09-02 PROCEDURE — 85025 COMPLETE CBC W/AUTO DIFF WBC: CPT | Performed by: INTERNAL MEDICINE

## 2021-09-02 PROCEDURE — 80053 COMPREHEN METABOLIC PANEL: CPT | Performed by: INTERNAL MEDICINE

## 2021-09-02 PROCEDURE — 25010000002 ENOXAPARIN PER 10 MG: Performed by: INTERNAL MEDICINE

## 2021-09-02 PROCEDURE — 85027 COMPLETE CBC AUTOMATED: CPT | Performed by: INTERNAL MEDICINE

## 2021-09-02 PROCEDURE — 25010000002 ONDANSETRON PER 1 MG: Performed by: INTERNAL MEDICINE

## 2021-09-02 RX ADMIN — SODIUM CHLORIDE, PRESERVATIVE FREE 10 ML: 5 INJECTION INTRAVENOUS at 08:29

## 2021-09-02 RX ADMIN — HYDROCODONE BITARTRATE AND ACETAMINOPHEN 1 TABLET: 10; 325 TABLET ORAL at 11:22

## 2021-09-02 RX ADMIN — HYDROCODONE BITARTRATE AND ACETAMINOPHEN 1 TABLET: 10; 325 TABLET ORAL at 05:13

## 2021-09-02 RX ADMIN — FAMOTIDINE 40 MG: 20 TABLET ORAL at 08:29

## 2021-09-02 RX ADMIN — ENOXAPARIN SODIUM 30 MG: 30 INJECTION, SOLUTION INTRAVENOUS; SUBCUTANEOUS at 13:42

## 2021-09-02 RX ADMIN — Medication 5 MG: at 00:17

## 2021-09-02 RX ADMIN — ONDANSETRON 4 MG: 2 INJECTION INTRAMUSCULAR; INTRAVENOUS at 08:29

## 2021-09-02 RX ADMIN — HYDROCODONE BITARTRATE AND ACETAMINOPHEN 1 TABLET: 10; 325 TABLET ORAL at 00:17

## 2021-09-02 NOTE — PLAN OF CARE
Goal Outcome Evaluation:              Pt disoriented at times during shift. APRN notified. Blinds kept open this shift and reorientation provided. Sister called and is concerned about her going home with her confusion. Pt continues to c/o stomach pain. CT of abdomen ordered. Instructed by APRN to hold colace to see if that is the pts problem. HD ordered for today. Left message on grasshopper line.

## 2021-09-02 NOTE — PROGRESS NOTES
[facility]     Progress Note    Patient Name: Clarissa Augustin  : 1965  MRN: [id number]  Primary Care Physician:  Mirian Caicedo  Date of admission: 2021    Subjective   Subjective     Chief Complaint:sickle cell crisis    Patient reports her abdomen is sore near her catheter site.  She also states she had a bout of loose stool yesterday but she is on a stool softener/laxative and the nurse held it this morning.  She states her pain is better controlled, but the patient has had some bouts of confusion during her stay, which is not in her baseline.No other concerns or complaints    Patient Reports diarrhea    Review of Systems   Gastrointestinal: Positive for abdominal pain and diarrhea.   Musculoskeletal: Positive for arthralgias.   All other systems reviewed and are negative.      Objective   Objective     Vitals:   Temp:  [97.3 °F (36.3 °C)-100 °F (37.8 °C)] 97.7 °F (36.5 °C)  Heart Rate:  [] 89  Resp:  [16] 16  BP: (146-189)/(68-86) 159/77    Physical Exam  Vitals and nursing note reviewed.   HENT:      Mouth/Throat:      Mouth: Mucous membranes are moist.   Eyes:      Pupils: Pupils are equal, round, and reactive to light.   Cardiovascular:      Rate and Rhythm: Normal rate and regular rhythm.      Pulses: Normal pulses.      Heart sounds: Normal heart sounds.   Pulmonary:      Effort: Pulmonary effort is normal.   Abdominal:      General: Abdomen is flat. Bowel sounds are normal.      Palpations: Abdomen is soft.      Tenderness: There is abdominal tenderness. There is guarding.   Skin:     General: Skin is warm.      Capillary Refill: Capillary refill takes less than 2 seconds.   Neurological:      Mental Status: She is alert.   Psychiatric:         Mood and Affect: Mood normal.         Behavior: Behavior normal.          Result Review    Result Review:  I have personally reviewed the results from the time of this admission to 2021 11:02 EDT and agree with these  findings:  [x]  Laboratory  [x]  Microbiology  [x]  Radiology  []  EKG/Telemetry   []  Cardiology/Vascular   []  Pathology  []  Old records  []  Other:  Most notable findings include: HGB baseline for SCD    Assessment/Plan   Assessment / Plan     Brief Patient Summary:  Clarissa Augustin is a 55 y.o. female who  with past medical history significant for ESRD on HD , hypertension and sickle cell disease chronic diastolic congestive heart failure had a PD catheter placed 3 days ago and came to the emergency room because of hurting all over especially in her bones and she has sickle cell crisis and because of that reason is decided to admit the patient.  She did not have dialysis for almost 6 days.  Patient was also found to be hyperkalemic    Active Hospital Problems:  Active Hospital Problems    Diagnosis    • Hyperkalemia    • Sickle cell crisis (CMS/[facility])    • Essential hypertension    • ESRD (end stage renal disease) on dialysis (CMS/[facility])      Plan:   Make sure to keep the blinds open during the day for delirium  HD today  CT abdomen for acute pain and diarrhea, but I believe the diarrhea is due to bowel medications.    Continue to up with assistance    DVT prophylaxis:  Medical DVT prophylaxis orders are present.    CODE STATUS:    Code Status: CPR  Medical Interventions (Level of Support Prior to Arrest): Full    Disposition:  I expect patient to be discharged 1-2 days    Electronically signed by ELOY Capps, 09/02/21, 11:02 AM EDT.

## 2021-09-02 NOTE — PLAN OF CARE
Goal Outcome Evaluation:           Progress: no change  Outcome Summary: Pt did not get out of bed by herself this shift. however has complained of abdominal pain frequently. medicated x3 for pain.

## 2021-09-03 LAB
ABO GROUP BLD: NORMAL
ABO GROUP BLD: NORMAL
ALBUMIN SERPL-MCNC: 3.9 G/DL (ref 3.5–5.2)
ALBUMIN/GLOB SERPL: 1.2 G/DL
ALP SERPL-CCNC: 139 U/L (ref 39–117)
ALT SERPL W P-5'-P-CCNC: 7 U/L (ref 1–33)
ANION GAP SERPL CALCULATED.3IONS-SCNC: 12 MMOL/L (ref 5–15)
ANTI-C: NORMAL
ANTI-E: NORMAL
AST SERPL-CCNC: 32 U/L (ref 1–32)
BILIRUB SERPL-MCNC: 2.8 MG/DL (ref 0–1.2)
BLD GP AB SCN SERPL QL: POSITIVE
BUN SERPL-MCNC: 14 MG/DL (ref 6–20)
BUN/CREAT SERPL: 5.5 (ref 7–25)
CALCIUM SPEC-SCNC: 9 MG/DL (ref 8.6–10.5)
CHLORIDE SERPL-SCNC: 104 MMOL/L (ref 98–107)
CO2 SERPL-SCNC: 24 MMOL/L (ref 22–29)
CREAT SERPL-MCNC: 2.53 MG/DL (ref 0.57–1)
DEPRECATED RDW RBC AUTO: 58.7 FL (ref 37–54)
ERYTHROCYTE [DISTWIDTH] IN BLOOD BY AUTOMATED COUNT: 20.8 % (ref 12.3–15.4)
GFR SERPL CREATININE-BSD FRML MDRD: 24 ML/MIN/1.73
GLOBULIN UR ELPH-MCNC: 3.2 GM/DL
GLUCOSE SERPL-MCNC: 144 MG/DL (ref 65–99)
HCT VFR BLD AUTO: 14.4 % (ref 34–46.6)
HGB BLD-MCNC: 5.1 G/DL (ref 12–15.9)
MCH RBC QN AUTO: 30.5 PG (ref 26.6–33)
MCHC RBC AUTO-ENTMCNC: 35.4 G/DL (ref 31.5–35.7)
MCV RBC AUTO: 86.2 FL (ref 79–97)
PLATELET # BLD AUTO: 233 10*3/MM3 (ref 140–450)
PMV BLD AUTO: 11.2 FL (ref 6–12)
POTASSIUM SERPL-SCNC: 3.1 MMOL/L (ref 3.5–5.2)
PROT SERPL-MCNC: 7.1 G/DL (ref 6–8.5)
RBC # BLD AUTO: 1.67 10*6/MM3 (ref 3.77–5.28)
RH BLD: POSITIVE
RH BLD: POSITIVE
SODIUM SERPL-SCNC: 140 MMOL/L (ref 136–145)
T&S EXPIRATION DATE: NORMAL
WBC # BLD AUTO: 10.66 10*3/MM3 (ref 3.4–10.8)

## 2021-09-03 PROCEDURE — 86902 BLOOD TYPE ANTIGEN DONOR EA: CPT

## 2021-09-03 PROCEDURE — 86920 COMPATIBILITY TEST SPIN: CPT

## 2021-09-03 PROCEDURE — 25010000002 ENOXAPARIN PER 10 MG: Performed by: INTERNAL MEDICINE

## 2021-09-03 PROCEDURE — 86900 BLOOD TYPING SEROLOGIC ABO: CPT | Performed by: INTERNAL MEDICINE

## 2021-09-03 PROCEDURE — 36430 TRANSFUSION BLD/BLD COMPNT: CPT

## 2021-09-03 PROCEDURE — 85027 COMPLETE CBC AUTOMATED: CPT | Performed by: INTERNAL MEDICINE

## 2021-09-03 PROCEDURE — 86870 RBC ANTIBODY IDENTIFICATION: CPT | Performed by: INTERNAL MEDICINE

## 2021-09-03 PROCEDURE — 86922 COMPATIBILITY TEST ANTIGLOB: CPT

## 2021-09-03 PROCEDURE — P9016 RBC LEUKOCYTES REDUCED: HCPCS

## 2021-09-03 PROCEDURE — 86850 RBC ANTIBODY SCREEN: CPT | Performed by: INTERNAL MEDICINE

## 2021-09-03 PROCEDURE — 85660 RBC SICKLE CELL TEST: CPT

## 2021-09-03 PROCEDURE — 80053 COMPREHEN METABOLIC PANEL: CPT | Performed by: INTERNAL MEDICINE

## 2021-09-03 PROCEDURE — 86901 BLOOD TYPING SEROLOGIC RH(D): CPT | Performed by: INTERNAL MEDICINE

## 2021-09-03 PROCEDURE — 86900 BLOOD TYPING SEROLOGIC ABO: CPT

## 2021-09-03 RX ADMIN — HYDROCODONE BITARTRATE AND ACETAMINOPHEN 1 TABLET: 10; 325 TABLET ORAL at 17:22

## 2021-09-03 RX ADMIN — DOCUSATE SODIUM 50MG AND SENNOSIDES 8.6MG 2 TABLET: 8.6; 5 TABLET, FILM COATED ORAL at 09:22

## 2021-09-03 RX ADMIN — FAMOTIDINE 40 MG: 20 TABLET ORAL at 09:21

## 2021-09-03 RX ADMIN — ENOXAPARIN SODIUM 30 MG: 30 INJECTION, SOLUTION INTRAVENOUS; SUBCUTANEOUS at 13:14

## 2021-09-03 RX ADMIN — DOCUSATE SODIUM 50MG AND SENNOSIDES 8.6MG 2 TABLET: 8.6; 5 TABLET, FILM COATED ORAL at 21:19

## 2021-09-03 RX ADMIN — HYDROCODONE BITARTRATE AND ACETAMINOPHEN 1 TABLET: 10; 325 TABLET ORAL at 06:16

## 2021-09-03 RX ADMIN — HYDROCODONE BITARTRATE AND ACETAMINOPHEN 1 TABLET: 10; 325 TABLET ORAL at 22:44

## 2021-09-03 NOTE — PROGRESS NOTES
[facility]     Progress Note    Patient Name: Clarissa Augustin  : 1965  MRN: [id number]  Primary Care Physician:  Mirian Caicedo  Date of admission: 2021    Subjective   Mental status appears to be at baseline.  She is alert oriented.  Remains very weak.  Had dialysis last night.  Tolerated well.  Pain controlled with opioids.  Review of Systems  Constitutional:       Weakness tiredness fatigue  Eyes:                      No blurry vision, eye discharge, eye irritation, eye pain  HEENT:                  No acute hair loss, earache and discharge, nasal congestion or discharge, sore throat, postnasal drip  Respiratory:          No shortness of breath coughing sputum production wheezing hemoptysis pleuritic chest pain  Cardiovascular:    No chest pain, orthopnea, PND, dizziness, palpitation, lower extremity edema  Gastrointestinal:  No nausea vomiting diarrhea abdominal pain constipation  Genitourinary:      No urinary incontinence, hesitancy, frequency, urgency, dysuria  Neurological:       No confusion, headache, focal weakness, numbness, dysphasia  Hematologic:        No bruising, bleeding, pallor, lymphadenopathy  Endocrine:           No coldness, hot flashes, polyuria, abnormal hair growth  Musculoskeletal:   No body pains, aches, arthritic pains, muscle pain ,muscle wasting  Psychiatric:         No low or high mood, anxiety, hallucinations, delusions  Skin.                     No rash, ulcers, bruising, itching    Objective   Objective     Vitals:   Temp:  [97.52 °F (36.4 °C)-98.9 °F (37.2 °C)] 98.1 °F (36.7 °C)  Heart Rate:  [85-91] 89  Resp:  [16-20] 16  BP: (154-178)/(78-94) 170/94  Physical Exam    Constitutional:        Awake, alert responsive, conversant, no obvious distress   Eyes:                      PERRLA, sclerae anicteric, no conjunctival injection   HEENT:                  Moist mucous membranes, no nasal or eye discharge, no throat congestion   Neck:                      Supple, no thyromegaly, no lymphadenopathy, trachea midline, no elevated JVD   Respiratory:          Clear to auscultation bilaterally, nonlabored respirations    Cardiovascular:    RRR, no murmurs, rubs, or gallops, palpable pedal pulses bilaterally,No bilateral ankle edema   Gastrointestinal:  Positive bowel sounds, soft, nontender, non distended, no organomegaly   Musculoskeletal: , no clubbing or cyanosis to extremities,muscle wasting, joint swelling, muscle weakness   Psychiatric:             Appropriate affect, cooperative   Neurologic:           Awake alert ,oriented x 3, strength symmetric in all extremities, Cranial Nerves grossly intact to confrontation, speech clear   Skin:                     No rashes , bruising's, skin ulcers, petechiae or ecchymosis    Result Review    Result Review:  I have personally reviewed the results from the time of this admission to 9/3/2021 12:32 EDT and agree with these findings:  []  Laboratory  []  Microbiology  []  Radiology  []  EKG/Telemetry   []  Cardiology/Vascular   []  Pathology  []  Old records  []  Other:    Assessment/Plan   Assessment / Plan       Active Hospital Problems:  Active Hospital Problems    Diagnosis    • Hyperkalemia    • Sickle cell crisis (CMS/HCC)    • Essential hypertension    • ESRD (end stage renal disease) on dialysis (CMS/HCC)        Plan:   Hemoglobin down to 5.4-1 unit PRBC ordered  Continue hemodialysis TTS schedule  Pain control  PT OT,  consult for possible rehab placement     Electronically signed by Demarco Hedrick MD, 09/03/21, 12:34 PM EDT.

## 2021-09-03 NOTE — PLAN OF CARE
Goal Outcome Evaluation:           Progress: no change  Outcome Summary: Patient received 1unit of PRBC today. IV infiltrated during infusion, but patient received most of the transfusion. Hasn't c/o much pain besides some abdominal cramping. Talked to sister on the phone, very concerned about patient's confusion and wanting therapy to work with patient.

## 2021-09-03 NOTE — PLAN OF CARE
Goal Outcome Evaluation:      Patient is very weak and requires heavy assistance when ambulating. Patient received Dialysis during the shift. While in dialysis lab called up a critical hgb of 5.4 and hct of 15. Ryley was notified and I unit of blood was ordered. Ryley asked for the blood to be given down in dialysis. Dialysis nurse said patients dialysis was close to being finished and to do it on the floor. Waiting for type and screen to come back and consent to be signed.

## 2021-09-04 LAB
ANION GAP SERPL CALCULATED.3IONS-SCNC: 13.5 MMOL/L (ref 5–15)
BH BB BLOOD EXPIRATION DATE: NORMAL
BH BB BLOOD TYPE BARCODE: 9500
BH BB DISPENSE STATUS: NORMAL
BH BB PRODUCT CODE: NORMAL
BH BB UNIT NUMBER: NORMAL
BUN SERPL-MCNC: 30 MG/DL (ref 6–20)
BUN/CREAT SERPL: 6.3 (ref 7–25)
CALCIUM SPEC-SCNC: 9.1 MG/DL (ref 8.6–10.5)
CHLORIDE SERPL-SCNC: 104 MMOL/L (ref 98–107)
CO2 SERPL-SCNC: 16.5 MMOL/L (ref 22–29)
CREAT SERPL-MCNC: 4.78 MG/DL (ref 0.57–1)
CROSSMATCH INTERPRETATION: NORMAL
DEPRECATED RDW RBC AUTO: 60.5 FL (ref 37–54)
ERYTHROCYTE [DISTWIDTH] IN BLOOD BY AUTOMATED COUNT: 22.2 % (ref 12.3–15.4)
GFR SERPL CREATININE-BSD FRML MDRD: 12 ML/MIN/1.73
GFR SERPL CREATININE-BSD FRML MDRD: ABNORMAL ML/MIN/{1.73_M2}
GLUCOSE SERPL-MCNC: 89 MG/DL (ref 65–99)
HCT VFR BLD AUTO: 16.4 % (ref 34–46.6)
HGB BLD-MCNC: 5.7 G/DL (ref 12–15.9)
MCH RBC QN AUTO: 31.1 PG (ref 26.6–33)
MCHC RBC AUTO-ENTMCNC: 34.8 G/DL (ref 31.5–35.7)
MCV RBC AUTO: 89.6 FL (ref 79–97)
PLATELET # BLD AUTO: 261 10*3/MM3 (ref 140–450)
PMV BLD AUTO: 11 FL (ref 6–12)
POTASSIUM SERPL-SCNC: 4 MMOL/L (ref 3.5–5.2)
RBC # BLD AUTO: 1.83 10*6/MM3 (ref 3.77–5.28)
SODIUM SERPL-SCNC: 134 MMOL/L (ref 136–145)
UNIT  ABO: NORMAL
UNIT  RH: NORMAL
WBC # BLD AUTO: 10.33 10*3/MM3 (ref 3.4–10.8)

## 2021-09-04 PROCEDURE — 80048 BASIC METABOLIC PNL TOTAL CA: CPT | Performed by: INTERNAL MEDICINE

## 2021-09-04 PROCEDURE — P9016 RBC LEUKOCYTES REDUCED: HCPCS

## 2021-09-04 PROCEDURE — 85027 COMPLETE CBC AUTOMATED: CPT | Performed by: INTERNAL MEDICINE

## 2021-09-04 PROCEDURE — 25010000002 HYDROMORPHONE 1 MG/ML SOLUTION: Performed by: INTERNAL MEDICINE

## 2021-09-04 PROCEDURE — 86900 BLOOD TYPING SEROLOGIC ABO: CPT

## 2021-09-04 PROCEDURE — 25010000002 HEPARIN (PORCINE) PER 1000 UNITS: Performed by: INTERNAL MEDICINE

## 2021-09-04 RX ORDER — AMLODIPINE BESYLATE 5 MG/1
10 TABLET ORAL ONCE
Status: COMPLETED | OUTPATIENT
Start: 2021-09-05 | End: 2021-09-05

## 2021-09-04 RX ORDER — CARVEDILOL 25 MG/1
25 TABLET ORAL 2 TIMES DAILY WITH MEALS
Status: DISCONTINUED | OUTPATIENT
Start: 2021-09-05 | End: 2021-09-08 | Stop reason: HOSPADM

## 2021-09-04 RX ORDER — LOSARTAN POTASSIUM 50 MG/1
100 TABLET ORAL
Status: DISCONTINUED | OUTPATIENT
Start: 2021-09-05 | End: 2021-09-08 | Stop reason: HOSPADM

## 2021-09-04 RX ORDER — HEPARIN SODIUM 5000 [USP'U]/ML
5000 INJECTION, SOLUTION INTRAVENOUS; SUBCUTANEOUS EVERY 8 HOURS SCHEDULED
Status: DISCONTINUED | OUTPATIENT
Start: 2021-09-04 | End: 2021-09-08 | Stop reason: HOSPADM

## 2021-09-04 RX ADMIN — ALPRAZOLAM 0.5 MG: 0.25 TABLET ORAL at 02:08

## 2021-09-04 RX ADMIN — HYDROCODONE BITARTRATE AND ACETAMINOPHEN 1 TABLET: 5; 325 TABLET ORAL at 16:24

## 2021-09-04 RX ADMIN — HYDROMORPHONE HYDROCHLORIDE 0.5 MG: 1 INJECTION, SOLUTION INTRAMUSCULAR; INTRAVENOUS; SUBCUTANEOUS at 05:42

## 2021-09-04 RX ADMIN — DOCUSATE SODIUM 50MG AND SENNOSIDES 8.6MG 2 TABLET: 8.6; 5 TABLET, FILM COATED ORAL at 11:12

## 2021-09-04 RX ADMIN — HEPARIN SODIUM 5000 UNITS: 5000 INJECTION, SOLUTION INTRAVENOUS; SUBCUTANEOUS at 14:49

## 2021-09-04 RX ADMIN — HYDROCODONE BITARTRATE AND ACETAMINOPHEN 1 TABLET: 5; 325 TABLET ORAL at 02:07

## 2021-09-04 RX ADMIN — FAMOTIDINE 40 MG: 20 TABLET ORAL at 11:12

## 2021-09-04 NOTE — PROGRESS NOTES
[facility]     Progress Note    Patient Name: Clarissa Augustin  : 1965  MRN: [id number]  Primary Care Physician:  Mirian Caicedo  Date of admission: 2021    Subjective   Mental status is at baseline.  Still complaining of significant pain all over.  Review of Systems  Constitutional:       Weakness tiredness fatigue  Eyes:                      No blurry vision, eye discharge, eye irritation, eye pain  HEENT:                  No acute hair loss, earache and discharge, nasal congestion or discharge, sore throat, postnasal drip  Respiratory:          No shortness of breath coughing sputum production wheezing hemoptysis pleuritic chest pain  Cardiovascular:    No chest pain, orthopnea, PND, dizziness, palpitation, lower extremity edema  Gastrointestinal:  No nausea vomiting diarrhea abdominal pain constipation  Genitourinary:      No urinary incontinence, hesitancy, frequency, urgency, dysuria  Neurological:       No confusion, headache, focal weakness, numbness, dysphasia  Hematologic:        No bruising, bleeding, pallor, lymphadenopathy  Endocrine:           No coldness, hot flashes, polyuria, abnormal hair growth  Musculoskeletal:   No body pains, aches, arthritic pains, muscle pain ,muscle wasting  Psychiatric:         No low or high mood, anxiety, hallucinations, delusions  Skin.                     No rash, ulcers, bruising, itching    Objective   Objective     Vitals:   Temp:  [97.9 °F (36.6 °C)-99 °F (37.2 °C)] 98.4 °F (36.9 °C)  Heart Rate:  [62-93] 87  Resp:  [16-20] 20  BP: (163-182)/(72-99) 180/84  Physical Exam    Constitutional:        Awake, alert responsive, conversant, no obvious distress   Eyes:                      PERRLA, sclerae anicteric, no conjunctival injection   HEENT:                  Moist mucous membranes, no nasal or eye discharge, no throat congestion   Neck:                     Supple, no thyromegaly, no lymphadenopathy, trachea midline, no elevated  JVD   Respiratory:          Clear to auscultation bilaterally, nonlabored respirations    Cardiovascular:    RRR, no murmurs, rubs, or gallops, palpable pedal pulses bilaterally,No bilateral ankle edema   Gastrointestinal:  Positive bowel sounds, soft, nontender, non distended, no organomegaly   Musculoskeletal: , no clubbing or cyanosis to extremities,muscle wasting, joint swelling, muscle weakness   Psychiatric:             Appropriate affect, cooperative   Neurologic:           Awake alert ,oriented x 3, strength symmetric in all extremities, Cranial Nerves grossly intact to confrontation, speech clear   Skin:                     No rashes , bruising's, skin ulcers, petechiae or ecchymosis    Result Review    Result Review:  I have personally reviewed the results from the time of this admission to 9/4/2021 11:16 EDT and agree with these findings:  []  Laboratory  []  Microbiology  []  Radiology  []  EKG/Telemetry   []  Cardiology/Vascular   []  Pathology  []  Old records  []  Other:    Assessment/Plan   Assessment / Plan       Active Hospital Problems:  Active Hospital Problems    Diagnosis    • Hyperkalemia    • Sickle cell crisis (CMS/[facility])    • Essential hypertension    • ESRD (end stage renal disease) on dialysis (CMS/[facility])        Plan:   Received 1 unit PRBC yesterday  Check CBC today  Hemodialysis today  Continue hemodialysis TTS schedule  Pain control  PT OT,  consult for possible rehab placement     Electronically signed by Demarco Hedrick MD, 09/04/21, 11:17 AM EDT.

## 2021-09-04 NOTE — PLAN OF CARE
Goal Outcome Evaluation:      Patient has complained of pain throughout the night. Patient has been more alert then she was the previous night. Patient is still very weak when ambulating. Anticipating morning labs.

## 2021-09-04 NOTE — PLAN OF CARE
Goal Outcome Evaluation:  Patient hgb 5.4 today and hct 16.4  Dr. Hedrick notified and ordered 1u prbc to be transfused in dialysis.

## 2021-09-05 LAB
ANISOCYTOSIS BLD QL: ABNORMAL
C3 FRG RBC-MCNC: ABNORMAL
DACRYOCYTES BLD QL SMEAR: ABNORMAL
DEPRECATED RDW RBC AUTO: 53.4 FL (ref 37–54)
DIMORPHIC RBC: PRESENT
EOSINOPHIL # BLD MANUAL: 0.76 10*3/MM3 (ref 0–0.4)
EOSINOPHIL NFR BLD MANUAL: 7 % (ref 0.3–6.2)
ERYTHROCYTE [DISTWIDTH] IN BLOOD BY AUTOMATED COUNT: 19.7 % (ref 12.3–15.4)
HCT VFR BLD AUTO: 19.5 % (ref 34–46.6)
HGB BLD-MCNC: 6.8 G/DL (ref 12–15.9)
HYPOCHROMIA BLD QL: ABNORMAL
LYMPHOCYTES # BLD MANUAL: 2.5 10*3/MM3 (ref 0.7–3.1)
LYMPHOCYTES NFR BLD MANUAL: 2 % (ref 5–12)
LYMPHOCYTES NFR BLD MANUAL: 23 % (ref 19.6–45.3)
MACROCYTES BLD QL SMEAR: ABNORMAL
MCH RBC QN AUTO: 30.5 PG (ref 26.6–33)
MCHC RBC AUTO-ENTMCNC: 34.9 G/DL (ref 31.5–35.7)
MCV RBC AUTO: 87.4 FL (ref 79–97)
MICROCYTES BLD QL: ABNORMAL
MONOCYTES # BLD AUTO: 0.22 10*3/MM3 (ref 0.1–0.9)
NEUTROPHILS # BLD AUTO: 7.4 10*3/MM3 (ref 1.7–7)
NEUTROPHILS NFR BLD MANUAL: 66 % (ref 42.7–76)
NEUTS BAND NFR BLD MANUAL: 2 % (ref 0–5)
NEUTS HYPERSEG # BLD: ABNORMAL 10*3/UL
NRBC SPEC MANUAL: 167 /100 WBC (ref 0–0.2)
PLATELET # BLD AUTO: 218 10*3/MM3 (ref 140–450)
PMV BLD AUTO: 10.4 FL (ref 6–12)
POIKILOCYTOSIS BLD QL SMEAR: ABNORMAL
RBC # BLD AUTO: 2.23 10*6/MM3 (ref 3.77–5.28)
SICKLE CELLS: ABNORMAL
SMALL PLATELETS BLD QL SMEAR: ADEQUATE
WBC # BLD AUTO: 10.88 10*3/MM3 (ref 3.4–10.8)

## 2021-09-05 PROCEDURE — 85025 COMPLETE CBC W/AUTO DIFF WBC: CPT | Performed by: INTERNAL MEDICINE

## 2021-09-05 PROCEDURE — 25010000002 HEPARIN (PORCINE) PER 1000 UNITS: Performed by: INTERNAL MEDICINE

## 2021-09-05 PROCEDURE — 85007 BL SMEAR W/DIFF WBC COUNT: CPT | Performed by: INTERNAL MEDICINE

## 2021-09-05 RX ADMIN — HEPARIN SODIUM 5000 UNITS: 5000 INJECTION, SOLUTION INTRAVENOUS; SUBCUTANEOUS at 05:37

## 2021-09-05 RX ADMIN — HYDROCODONE BITARTRATE AND ACETAMINOPHEN 1 TABLET: 5; 325 TABLET ORAL at 09:17

## 2021-09-05 RX ADMIN — HYDROCODONE BITARTRATE AND ACETAMINOPHEN 1 TABLET: 5; 325 TABLET ORAL at 05:37

## 2021-09-05 RX ADMIN — HEPARIN SODIUM 5000 UNITS: 5000 INJECTION, SOLUTION INTRAVENOUS; SUBCUTANEOUS at 14:21

## 2021-09-05 RX ADMIN — ALUMINUM HYDROXIDE, MAGNESIUM HYDROXIDE, AND DIMETHICONE 15 ML: 400; 400; 40 SUSPENSION ORAL at 20:08

## 2021-09-05 RX ADMIN — HEPARIN SODIUM 5000 UNITS: 5000 INJECTION, SOLUTION INTRAVENOUS; SUBCUTANEOUS at 21:30

## 2021-09-05 RX ADMIN — CARVEDILOL 25 MG: 25 TABLET, FILM COATED ORAL at 07:28

## 2021-09-05 RX ADMIN — DOCUSATE SODIUM 50MG AND SENNOSIDES 8.6MG 2 TABLET: 8.6; 5 TABLET, FILM COATED ORAL at 20:08

## 2021-09-05 RX ADMIN — ACETAMINOPHEN 650 MG: 325 TABLET ORAL at 14:34

## 2021-09-05 RX ADMIN — HEPARIN SODIUM 5000 UNITS: 5000 INJECTION, SOLUTION INTRAVENOUS; SUBCUTANEOUS at 00:23

## 2021-09-05 RX ADMIN — LOSARTAN POTASSIUM 100 MG: 50 TABLET, FILM COATED ORAL at 09:24

## 2021-09-05 RX ADMIN — HYDROCODONE BITARTRATE AND ACETAMINOPHEN 1 TABLET: 10; 325 TABLET ORAL at 00:24

## 2021-09-05 RX ADMIN — ACETAMINOPHEN 650 MG: 325 TABLET ORAL at 20:08

## 2021-09-05 RX ADMIN — FAMOTIDINE 40 MG: 20 TABLET ORAL at 09:17

## 2021-09-05 RX ADMIN — CARVEDILOL 25 MG: 25 TABLET, FILM COATED ORAL at 18:38

## 2021-09-05 RX ADMIN — AMLODIPINE BESYLATE 10 MG: 5 TABLET ORAL at 00:24

## 2021-09-05 NOTE — PROGRESS NOTES
[facility]     Progress Note    Patient Name: Clarissa Augustin  : 1965  MRN: [id number]  Primary Care Physician:  Mirian Caicedo  Date of admission: 2021    Subjective   Mental status is at baseline.  Still complaining of significant pain all over.  Received 1 PRBC with HD yesterday    Review of Systems  Constitutional:       Weakness tiredness fatigue  Eyes:                      No blurry vision, eye discharge, eye irritation, eye pain  HEENT:                  No acute hair loss, earache and discharge, nasal congestion or discharge, sore throat, postnasal drip  Respiratory:          No shortness of breath coughing sputum production wheezing hemoptysis pleuritic chest pain  Cardiovascular:    No chest pain, orthopnea, PND, dizziness, palpitation, lower extremity edema  Gastrointestinal:  No nausea vomiting diarrhea abdominal pain constipation  Genitourinary:      No urinary incontinence, hesitancy, frequency, urgency, dysuria  Neurological:       No confusion, headache, focal weakness, numbness, dysphasia  Hematologic:        No bruising, bleeding, pallor, lymphadenopathy  Endocrine:           No coldness, hot flashes, polyuria, abnormal hair growth  Musculoskeletal:   No body pains, aches, arthritic pains, muscle pain ,muscle wasting  Psychiatric:         No low or high mood, anxiety, hallucinations, delusions  Skin.                     No rash, ulcers, bruising, itching    Objective   Objective     Vitals:   Temp:  [97.7 °F (36.5 °C)-99 °F (37.2 °C)] 98.7 °F (37.1 °C)  Heart Rate:  [76-89] 76  Resp:  [16-20] 18  BP: (133-209)/(63-98) 133/63  Physical Exam    Constitutional:        Awake, alert responsive, conversant, no obvious distress   Eyes:                      PERRLA, sclerae anicteric, no conjunctival injection   HEENT:                  Moist mucous membranes, no nasal or eye discharge, no throat congestion   Neck:                     Supple, no thyromegaly, no lymphadenopathy,  trachea midline, no elevated JVD   Respiratory:          Clear to auscultation bilaterally, nonlabored respirations    Cardiovascular:    RRR, no murmurs, rubs, or gallops, palpable pedal pulses bilaterally,No bilateral ankle edema   Gastrointestinal:  Positive bowel sounds, soft, nontender, non distended, no organomegaly   Musculoskeletal: , no clubbing or cyanosis to extremities,muscle wasting, joint swelling, muscle weakness   Psychiatric:             Appropriate affect, cooperative   Neurologic:           Awake alert ,oriented x 3, strength symmetric in all extremities, Cranial Nerves grossly intact to confrontation, speech clear   Skin:                     No rashes , bruising's, skin ulcers, petechiae or ecchymosis    Result Review    Result Review:  I have personally reviewed the results from the time of this admission to 9/5/2021 12:55 EDT and agree with these findings:  []  Laboratory  []  Microbiology  []  Radiology  []  EKG/Telemetry   []  Cardiology/Vascular   []  Pathology  []  Old records  []  Other:    Assessment/Plan   Assessment / Plan       Active Hospital Problems:  Active Hospital Problems    Diagnosis    • Hyperkalemia    • Sickle cell crisis (CMS/[facility])    • Essential hypertension    • ESRD (end stage renal disease) on dialysis (CMS/[facility])        Plan:   S/p 2 PRBC in total - Hgb 6.8 - acceptable - target Hgb > 6  Continue hemodialysis TTS schedule  Pain control  HTN - home dose anti-HTnsives restarted  PT OT,  consult for possible rehab placement     Electronically signed by Demarco Hedrick MD, 09/05/21, 12:56 PM EDT.

## 2021-09-06 PROCEDURE — 25010000002 HEPARIN (PORCINE) PER 1000 UNITS: Performed by: INTERNAL MEDICINE

## 2021-09-06 RX ADMIN — DOCUSATE SODIUM 50MG AND SENNOSIDES 8.6MG 2 TABLET: 8.6; 5 TABLET, FILM COATED ORAL at 08:27

## 2021-09-06 RX ADMIN — ACETAMINOPHEN 650 MG: 325 TABLET ORAL at 05:40

## 2021-09-06 RX ADMIN — FAMOTIDINE 40 MG: 20 TABLET ORAL at 08:27

## 2021-09-06 RX ADMIN — CARVEDILOL 25 MG: 25 TABLET, FILM COATED ORAL at 18:08

## 2021-09-06 RX ADMIN — CARVEDILOL 25 MG: 25 TABLET, FILM COATED ORAL at 08:26

## 2021-09-06 RX ADMIN — DOCUSATE SODIUM 50MG AND SENNOSIDES 8.6MG 2 TABLET: 8.6; 5 TABLET, FILM COATED ORAL at 21:11

## 2021-09-06 RX ADMIN — HEPARIN SODIUM 5000 UNITS: 5000 INJECTION, SOLUTION INTRAVENOUS; SUBCUTANEOUS at 05:40

## 2021-09-06 RX ADMIN — HEPARIN SODIUM 5000 UNITS: 5000 INJECTION, SOLUTION INTRAVENOUS; SUBCUTANEOUS at 21:11

## 2021-09-06 RX ADMIN — HEPARIN SODIUM 5000 UNITS: 5000 INJECTION, SOLUTION INTRAVENOUS; SUBCUTANEOUS at 14:00

## 2021-09-06 RX ADMIN — ACETAMINOPHEN 650 MG: 325 TABLET ORAL at 18:08

## 2021-09-06 RX ADMIN — LOSARTAN POTASSIUM 100 MG: 50 TABLET, FILM COATED ORAL at 08:27

## 2021-09-06 RX ADMIN — ACETAMINOPHEN 650 MG: 325 TABLET ORAL at 14:08

## 2021-09-06 NOTE — PLAN OF CARE
Goal Outcome Evaluation:  Plan of Care Reviewed With: patient        Progress: no change  Outcome Summary: Pt not confused this shift. Able to respond appropriately to all questions and situations this shift. Medicated for pain this shift. Concerned about her PD catheter and states that it needs to be flushed and asking if dressing needs to be changed, will notify oncoming shift.

## 2021-09-06 NOTE — PLAN OF CARE
Goal Outcome Evaluation:               Pt states she is feeling much better. Her back and abdominal pain have greatly decreased and had high spirits while talking to family on the phone. She set up a password so that her daughter would be able to get updates when calling.     AMARIS LESLIE RN

## 2021-09-06 NOTE — PROGRESS NOTES
Psychiatric     Progress Note    Patient Name: Clarissa Augustin  : 1965  MRN: [id number]  Primary Care Physician:  Mirian Caicedo  Date of admission: 2021    Subjective   Mental status is at baseline.  Pain is significantly improved.    Review of Systems  Constitutional:       Weakness tiredness fatigue  Eyes:                      No blurry vision, eye discharge, eye irritation, eye pain  HEENT:                  No acute hair loss, earache and discharge, nasal congestion or discharge, sore throat, postnasal drip  Respiratory:          No shortness of breath coughing sputum production wheezing hemoptysis pleuritic chest pain  Cardiovascular:    No chest pain, orthopnea, PND, dizziness, palpitation, lower extremity edema  Gastrointestinal:  No nausea vomiting diarrhea abdominal pain constipation  Genitourinary:      No urinary incontinence, hesitancy, frequency, urgency, dysuria  Neurological:       No confusion, headache, focal weakness, numbness, dysphasia  Hematologic:        No bruising, bleeding, pallor, lymphadenopathy  Endocrine:           No coldness, hot flashes, polyuria, abnormal hair growth  Musculoskeletal:   No body pains, aches, arthritic pains, muscle pain ,muscle wasting  Psychiatric:         No low or high mood, anxiety, hallucinations, delusions  Skin.                     No rash, ulcers, bruising, itching    Objective   Objective     Vitals:   Temp:  [97.7 °F (36.5 °C)-99 °F (37.2 °C)] 97.7 °F (36.5 °C)  Heart Rate:  [62-84] 77  Resp:  [16-18] 16  BP: (151-174)/(81-87) 161/82  Physical Exam    Constitutional:        Awake, alert responsive, conversant, no obvious distress   Eyes:                      PERRLA, sclerae anicteric, no conjunctival injection   HEENT:                  Moist mucous membranes, no nasal or eye discharge, no throat congestion   Neck:                     Supple, no thyromegaly, no lymphadenopathy, trachea midline, no elevated JVD   Respiratory:           Clear to auscultation bilaterally, nonlabored respirations    Cardiovascular:    RRR, no murmurs, rubs, or gallops, palpable pedal pulses bilaterally,No bilateral ankle edema   Gastrointestinal:  Positive bowel sounds, soft, nontender, non distended, no organomegaly   Musculoskeletal: , no clubbing or cyanosis to extremities,muscle wasting, joint swelling, muscle weakness   Psychiatric:             Appropriate affect, cooperative   Neurologic:           Awake alert ,oriented x 3, strength symmetric in all extremities, Cranial Nerves grossly intact to confrontation, speech clear   Skin:                     No rashes , bruising's, skin ulcers, petechiae or ecchymosis    Result Review    Result Review:  I have personally reviewed the results from the time of this admission to 9/6/2021 12:54 EDT and agree with these findings:  []  Laboratory  []  Microbiology  []  Radiology  []  EKG/Telemetry   []  Cardiology/Vascular   []  Pathology  []  Old records  []  Other:    Assessment/Plan   Assessment / Plan       Active Hospital Problems:  Active Hospital Problems    Diagnosis    • Hyperkalemia    • Sickle cell crisis (CMS/[facility])    • Essential hypertension    • ESRD (end stage renal disease) on dialysis (CMS/[facility])        Plan:   S/p 2 PRBC in total - Hgb 6.8 - acceptable - target Hgb > 6  Continue hemodialysis TTS schedule   Pain control  HTN - home dose anti-HTnsives restarted  PT OT,  consult for possible rehab placement     Electronically signed by Demarco Hedrick MD, 09/06/21, 12:54 PM EDT.

## 2021-09-07 LAB
ANION GAP SERPL CALCULATED.3IONS-SCNC: 14.6 MMOL/L (ref 5–15)
BUN SERPL-MCNC: 36 MG/DL (ref 6–20)
BUN/CREAT SERPL: 5.4 (ref 7–25)
CALCIUM SPEC-SCNC: 9.2 MG/DL (ref 8.6–10.5)
CHLORIDE SERPL-SCNC: 100 MMOL/L (ref 98–107)
CO2 SERPL-SCNC: 20.4 MMOL/L (ref 22–29)
CREAT SERPL-MCNC: 6.61 MG/DL (ref 0.57–1)
CYTO UR: NORMAL
DEPRECATED RDW RBC AUTO: 60.8 FL (ref 37–54)
ERYTHROCYTE [DISTWIDTH] IN BLOOD BY AUTOMATED COUNT: 19.9 % (ref 12.3–15.4)
GFR SERPL CREATININE-BSD FRML MDRD: 8 ML/MIN/1.73
GFR SERPL CREATININE-BSD FRML MDRD: ABNORMAL ML/MIN/{1.73_M2}
GLUCOSE SERPL-MCNC: 152 MG/DL (ref 65–99)
HCT VFR BLD AUTO: 22.1 % (ref 34–46.6)
HGB BLD-MCNC: 7.8 G/DL (ref 12–15.9)
LAB AP CASE REPORT: NORMAL
LAB AP CLINICAL INFORMATION: NORMAL
MCH RBC QN AUTO: 31.3 PG (ref 26.6–33)
MCHC RBC AUTO-ENTMCNC: 35.3 G/DL (ref 31.5–35.7)
MCV RBC AUTO: 88.8 FL (ref 79–97)
PATH REPORT.FINAL DX SPEC: NORMAL
PATH REPORT.GROSS SPEC: NORMAL
PLATELET # BLD AUTO: 342 10*3/MM3 (ref 140–450)
PMV BLD AUTO: 10.8 FL (ref 6–12)
POTASSIUM SERPL-SCNC: 3.7 MMOL/L (ref 3.5–5.2)
RBC # BLD AUTO: 2.49 10*6/MM3 (ref 3.77–5.28)
SODIUM SERPL-SCNC: 135 MMOL/L (ref 136–145)
WBC # BLD AUTO: 9.49 10*3/MM3 (ref 3.4–10.8)

## 2021-09-07 PROCEDURE — 97161 PT EVAL LOW COMPLEX 20 MIN: CPT

## 2021-09-07 PROCEDURE — 25010000002 HEPARIN (PORCINE) PER 1000 UNITS: Performed by: INTERNAL MEDICINE

## 2021-09-07 PROCEDURE — 85027 COMPLETE CBC AUTOMATED: CPT | Performed by: INTERNAL MEDICINE

## 2021-09-07 PROCEDURE — 80048 BASIC METABOLIC PNL TOTAL CA: CPT | Performed by: INTERNAL MEDICINE

## 2021-09-07 RX ORDER — FAMOTIDINE 20 MG/1
20 TABLET, FILM COATED ORAL NIGHTLY
Status: DISCONTINUED | OUTPATIENT
Start: 2021-09-07 | End: 2021-09-08 | Stop reason: HOSPADM

## 2021-09-07 RX ORDER — PANTOPRAZOLE SODIUM 40 MG/1
40 TABLET, DELAYED RELEASE ORAL
Status: DISCONTINUED | OUTPATIENT
Start: 2021-09-07 | End: 2021-09-08 | Stop reason: HOSPADM

## 2021-09-07 RX ADMIN — ACETAMINOPHEN 650 MG: 325 TABLET ORAL at 23:34

## 2021-09-07 RX ADMIN — FAMOTIDINE 20 MG: 20 TABLET, FILM COATED ORAL at 20:35

## 2021-09-07 RX ADMIN — CARVEDILOL 25 MG: 25 TABLET, FILM COATED ORAL at 11:31

## 2021-09-07 RX ADMIN — HEPARIN SODIUM 5000 UNITS: 5000 INJECTION, SOLUTION INTRAVENOUS; SUBCUTANEOUS at 23:35

## 2021-09-07 RX ADMIN — LOSARTAN POTASSIUM 100 MG: 50 TABLET, FILM COATED ORAL at 11:31

## 2021-09-07 RX ADMIN — PANTOPRAZOLE SODIUM 40 MG: 40 TABLET, DELAYED RELEASE ORAL at 11:31

## 2021-09-07 RX ADMIN — ACETAMINOPHEN 650 MG: 325 TABLET ORAL at 05:29

## 2021-09-07 RX ADMIN — DOCUSATE SODIUM 50MG AND SENNOSIDES 8.6MG 2 TABLET: 8.6; 5 TABLET, FILM COATED ORAL at 20:35

## 2021-09-07 RX ADMIN — CARVEDILOL 25 MG: 25 TABLET, FILM COATED ORAL at 18:30

## 2021-09-07 RX ADMIN — HEPARIN SODIUM 5000 UNITS: 5000 INJECTION, SOLUTION INTRAVENOUS; SUBCUTANEOUS at 05:28

## 2021-09-07 NOTE — THERAPY EVALUATION
Acute Care - Physical Therapy Initial Evaluation   Marcie     Patient Name: Clarissa Augustin  : 1965  MRN: [id number]  Today's Date: 2021      Visit Dx:     ICD-10-CM ICD-9-CM   1. Sickle cell crisis (CMS/HCC)  D57.00 282.62   2. Difficulty walking  R26.2 719.7     Patient Active Problem List   Diagnosis   • ESRD (end stage renal disease) on dialysis (CMS/HCC)   • Sickle cell crisis (CMS/HCC)   • Essential hypertension   • Hyperkalemia     Past Medical History:   Diagnosis Date   • Anemia     SICKEL CELL ANEMIA- ASYMPTOMATIC   • Heart attack (CMS/HCC)     SEE'S DR. KEENAN, [place] CP/SOB   • High blood pressure    • Hx of blood diseases     SICKEL CELL ANEMIA- ASYMPTOAMTIC- NO CURRENT PROBLEMS AT THIS TIME   • Kidney disease     ESRD - ON HEMODIALYSIS HELENA HENRIQUEZ, [place] - TDC IN RIGHT CHEST      Past Surgical History:   Procedure Laterality Date   • APPENDECTOMY     • GALLBLADDER SURGERY     • INSERTION PERITONEAL DIALYSIS CATHETER N/A 2021    Procedure: INSERTION PERITONEAL DIALYSIS CATHETER LAPAROSCOPIC;  Surgeon: Miki Mendoza MD;  Location: [facility] MAIN OR;  Service: General;  Laterality: N/A;   • PERITONEAL CATHETER INSERTION      REMOVED    • TUBAL ABDOMINAL LIGATION          PT Assessment (last 12 hours)      PT Evaluation and Treatment     Row Name 21 1109          Physical Therapy Time and Intention    Subjective Information  no complaints  (Pended)   -RV     Document Type  evaluation  (Pended)   -RV     Mode of Treatment  individual therapy;physical therapy  (Pended)   -RV     Patient Effort  excellent  (Pended)   -RV     Row Name 21 1109          General Information    Patient Profile Reviewed  yes  (Pended)   -RV     Patient Observations  alert;cooperative;agree to therapy  (Pended)   -RV     Prior Level of Function  independent:;gait;transfer;bed mobility;ADL's  (Pended)   -RV     Equipment Currently Used at Home  none  (Pended)   -RV      Existing Precautions/Restrictions  fall  (Pended)   -RV     Row Name 09/07/21 1109          Living Environment    Current Living Arrangements  home/apartment/condo  (Pended)   -RV     Row Name 09/07/21 1109          Range of Motion (ROM)    Range of Motion  ROM is WFL  (Pended)   -RV     Row Name 09/07/21 1109          Strength (Manual Muscle Testing)    Strength (Manual Muscle Testing)  strength is WFL  (Pended)   -RV     Row Name 09/07/21 1109          Bed Mobility    Bed Mobility  supine-sit;sit-supine  (Pended)   -RV     Supine-Sit [place] (Bed Mobility)  independent  (Pended)   -     Sit-Supine [place] (Bed Mobility)  independent  (Pended)   -RV     Row Name 09/07/21 1109          Transfers    Transfers  sit-stand transfer;stand-sit transfer  (Pended)   -     Sit-Stand [place] (Transfers)  independent  (Pended)   -     Stand-Sit [place] (Transfers)  independent  (Pended)   -RV     Row Name 09/07/21 1109          Gait/Stairs (Locomotion)    Gait/Stairs Locomotion  gait/ambulation independence  (Pended)   -     [place] Level (Gait)  contact guard;other (see comments)  (Pended)  handheld  -     Distance in Feet (Gait)  200  (Pended)   -RV     Comment (Gait/Stairs)  Daily ambulation is recommended  (Pended)   -RV     Row Name 09/07/21 1109          Safety Issues, Functional Mobility    Impairments Affecting Function (Mobility)  balance  (Pended)   -RV     Row Name 09/07/21 1109          Balance    Balance Assessment  standing dynamic balance  (Pended)   -     Dynamic Standing Balance  mild impairment  (Pended)   -RV     Row Name 09/07/21 1109          Plan of Care Review    Plan of Care Reviewed With  patient  (Pended)   -RV     Outcome Summary  The patient is independent with bed mobility and transfers.  The patient ambulated 200 feet with contact guard with handheld assist.  She is safe to ambulate with nursing and daily ambulation is recommended.  The patient does not need  skilled physical therapy and will be discharged at this time.  (Pended)   -RV     Row Name 09/07/21 1109          Therapy Assessment/Plan (PT)    Criteria for Skilled Interventions Met (PT)  no problems identified which require skilled intervention  (Pended)   -RV     Row Name 09/07/21 1109          PT Evaluation Complexity    History, PT Evaluation Complexity  no personal factors and/or comorbidities  (Pended)   -RV     Examination of Body Systems (PT Eval Complexity)  total of 4 or more elements  (Pended)   -RV     Clinical Presentation (PT Evaluation Complexity)  stable  (Pended)   -RV     Clinical Decision Making (PT Evaluation Complexity)  low complexity  (Pended)   -RV     Overall Complexity (PT Evaluation Complexity)  low complexity  (Pended)   -RV     Row Name 09/07/21 1109          Therapy Plan Review/Discharge Plan (PT)    Therapy Plan Review (PT)  patient  (Pended)   -       User Key  (r) = Recorded By, (t) = Taken By, (c) = Cosigned By    Initials Name Provider Type     Abby Douglas, PT Student PT Student        Physical Therapy Education                 Title: PT OT SLP Therapies (Done)     Topic: Physical Therapy (Done)     Point: Mobility training (Done)     Learning Progress Summary           Patient Acceptance, E,TB, VU by  at 9/7/2021 1123                   Point: Home exercise program (Done)     Learning Progress Summary           Patient Acceptance, E,TB, VU by  at 9/7/2021 1123                   Point: Body mechanics (Done)     Learning Progress Summary           Patient Acceptance, E,TB, VU by  at 9/7/2021 1123                   Point: Precautions (Done)     Learning Progress Summary           Patient Acceptance, E,TB, VU by  at 9/7/2021 1123                               User Key     Initials Effective Dates Name Provider Type Discipline     08/19/21 -  Abby Douglas, PT Student PT Student PT              PT Recommendation and Plan  Anticipated Discharge  Disposition (PT): (P) home, home with assist, home with home health  Therapy Frequency (PT): (P) evaluation only  Plan of Care Reviewed With: (P) patient  Outcome Summary: (P) The patient is independent with bed mobility and transfers.  The patient ambulated 200 feet with contact guard with handheld assist.  She is safe to ambulate with nursing and daily ambulation is recommended.  The patient does not need skilled physical therapy and will be discharged at this time.  Outcome Measures     Row Name 09/07/21 1122             How much help from another person do you currently need...    Turning from your back to your side while in flat bed without using bedrails?  4  (Pended)   -RV      Moving from lying on back to sitting on the side of a flat bed without bedrails?  4  (Pended)   -RV      Moving to and from a bed to a chair (including a wheelchair)?  3  (Pended)   -RV      Standing up from a chair using your arms (e.g., wheelchair, bedside chair)?  4  (Pended)   -RV      Climbing 3-5 steps with a railing?  3  (Pended)   -RV      To walk in hospital room?  3  (Pended)   -RV      AM-PAC 6 Clicks Score (PT)  21  (Pended)   -RV         Functional Assessment    Outcome Measure Options  AM-PAC 6 Clicks Basic Mobility (PT)  (Pended)   -RV        User Key  (r) = Recorded By, (t) = Taken By, (c) = Cosigned By    Initials Name Provider Type    Abby Roe, PT Student PT Student           Time Calculation:   PT Charges     Row Name 09/07/21 1126             Time Calculation    PT Received On  09/07/21  (Pended)   -RV         Untimed Charges    PT Eval/Re-eval Minutes  25  (Pended)   -RV         Total Minutes    Untimed Charges Total Minutes  25  (Pended)   -RV       Total Minutes  25  (Pended)   -RV        User Key  (r) = Recorded By, (t) = Taken By, (c) = Cosigned By    Initials Name Provider Type    Abby Roe, PT Student PT Student        Therapy Charges for Today     Code Description Service Date  Service Provider Modifiers Qty    78535911732 HC PT EVAL LOW COMPLEXITY 2 9/7/2021 Abby Douglas, PT Student GP 1          PT G-Codes  Outcome Measure Options: (P) AM-PAC 6 Clicks Basic Mobility (PT)  AM-PAC 6 Clicks Score (PT): (P) 21    Abby Douglas, PT Student  9/7/2021

## 2021-09-07 NOTE — PLAN OF CARE
Goal Outcome Evaluation:  Plan of Care Reviewed With: (P) patient           Outcome Summary: (P) The patient is independent with bed mobility and transfers.  The patient ambulated 200 feet with contact guard with handheld assist.  She is safe to ambulate with nursing and daily ambulation is recommended.  The patient does not need skilled physical therapy and will be discharged at this time.

## 2021-09-07 NOTE — PROGRESS NOTES
[facility]     Progress Note    Patient Name: Clarissa Augustin  : 1965  MRN: [id number]  Primary Care Physician:  Mirian Caicedo  Date of admission: 2021    Subjective   Patient is feeling much better and her mental function is back to its baseline  Pain it has significantly improved  He still complain of mid abdominal discomfort  Review of Systems  Constitutional:       Weakness tiredness fatigue  Eyes:                      No blurry vision, eye discharge, eye irritation, eye pain  HEENT:                  No acute hair loss, earache and discharge, nasal congestion or discharge, sore throat, postnasal drip  Respiratory:          No shortness of breath coughing sputum production wheezing hemoptysis pleuritic chest pain  Cardiovascular:    No chest pain, orthopnea, PND, dizziness, palpitation, lower extremity edema  Gastrointestinal:  No nausea vomiting diarrhea abdominal pain constipation  Genitourinary:      No urinary incontinence, hesitancy, frequency, urgency, dysuria  Neurological:       No confusion, headache, focal weakness, numbness, dysphasia  Hematologic:        No bruising, bleeding, pallor, lymphadenopathy  Endocrine:           No coldness, hot flashes, polyuria, abnormal hair growth  Musculoskeletal:   No body pains, aches, arthritic pains, muscle pain ,muscle wasting  Psychiatric:         No low or high mood, anxiety, hallucinations, delusions  Skin.                     No rash, ulcers, bruising, itching    Objective   Objective     Vitals:   Temp:  [98.1 °F (36.7 °C)-98.5 °F (36.9 °C)] 98.4 °F (36.9 °C)  Heart Rate:  [73-77] 74  Resp:  [18] 18  BP: (135-180)/(73-92) 154/81  Physical Exam    Constitutional:        Awake, alert responsive, conversant, no obvious distress   Eyes:                      PERRLA, sclerae anicteric, no conjunctival injection   HEENT:                  Moist mucous membranes, no nasal or eye discharge, no throat congestion   Neck:                      Supple, no thyromegaly, no lymphadenopathy, trachea midline, no elevated JVD   Respiratory:          Clear to auscultation bilaterally, nonlabored respirations    Cardiovascular:    RRR, no murmurs, rubs, or gallops, palpable pedal pulses bilaterally,No bilateral ankle edema   Gastrointestinal:  Positive bowel sounds, soft, nontender, non distended, no organomegaly   Musculoskeletal: , no clubbing or cyanosis to extremities,muscle wasting, joint swelling, muscle weakness   Psychiatric:             Appropriate affect, cooperative   Neurologic:           Awake alert ,oriented x 3, strength symmetric in all extremities, Cranial Nerves grossly intact to confrontation, speech clear   Skin:                     No rashes , bruising's, skin ulcers, petechiae or ecchymosis    Result Review    Result Review:  I have personally reviewed the results from the time of this admission to 9/7/2021 09:05 EDT and agree with these findings:  []  Laboratory  []  Microbiology  []  Radiology  []  EKG/Telemetry   []  Cardiology/Vascular   []  Pathology  []  Old records  []  Other:    Assessment/Plan   Assessment / Plan       Active Hospital Problems:  Active Hospital Problems    Diagnosis    • Hyperkalemia    • Sickle cell crisis (CMS/[facility])    • Essential hypertension    • ESRD (end stage renal disease) on dialysis (CMS/[facility])        Plan:   Patient is going for hemodialysis later today  She can be discharged tomorrow  She will start PD training next week  Start Protonix because of epigastric discomfort    Electronically signed by Greg Daniels MD, 09/07/21, 9:05 AM EDT.

## 2021-09-08 VITALS
HEIGHT: 66 IN | HEART RATE: 74 BPM | SYSTOLIC BLOOD PRESSURE: 134 MMHG | WEIGHT: 111.99 LBS | BODY MASS INDEX: 18 KG/M2 | OXYGEN SATURATION: 100 % | TEMPERATURE: 98.2 F | DIASTOLIC BLOOD PRESSURE: 77 MMHG | RESPIRATION RATE: 18 BRPM

## 2021-09-08 PROBLEM — E87.5 HYPERKALEMIA: Status: RESOLVED | Noted: 2021-08-31 | Resolved: 2021-09-08

## 2021-09-08 PROBLEM — D57.00 SICKLE CELL CRISIS (HCC): Status: RESOLVED | Noted: 2021-08-29 | Resolved: 2021-09-08

## 2021-09-08 PROCEDURE — 97165 OT EVAL LOW COMPLEX 30 MIN: CPT

## 2021-09-08 PROCEDURE — 25010000002 HEPARIN (PORCINE) PER 1000 UNITS: Performed by: INTERNAL MEDICINE

## 2021-09-08 RX ADMIN — HEPARIN SODIUM 5000 UNITS: 5000 INJECTION, SOLUTION INTRAVENOUS; SUBCUTANEOUS at 07:37

## 2021-09-08 RX ADMIN — CARVEDILOL 25 MG: 25 TABLET, FILM COATED ORAL at 09:00

## 2021-09-08 RX ADMIN — LOSARTAN POTASSIUM 100 MG: 50 TABLET, FILM COATED ORAL at 09:01

## 2021-09-08 RX ADMIN — PANTOPRAZOLE SODIUM 40 MG: 40 TABLET, DELAYED RELEASE ORAL at 07:37

## 2021-09-08 NOTE — PLAN OF CARE
Goal Outcome Evaluation:           Progress: no change  Outcome Summary: Patient is able to perform basic self-care activities and transfers with supervision to ensure safety with dynamic balance therefore no further skilled OT services indicated at this time.

## 2021-09-08 NOTE — THERAPY TREATMENT NOTE
Patient Name: Clarissa Augustin  : 1965    MRN: [id number]                              Today's Date: 2021       Admit Date: 2021    Visit Dx:     ICD-10-CM ICD-9-CM   1. Sickle cell crisis (CMS/HCC)  D57.00 282.62   2. Difficulty walking  R26.2 719.7   3. Decreased activities of daily living (ADL)  Z78.9 V49.89     Patient Active Problem List   Diagnosis   • ESRD (end stage renal disease) on dialysis (CMS/HCC)   • Sickle cell crisis (CMS/HCC)   • Essential hypertension   • Hyperkalemia     Past Medical History:   Diagnosis Date   • Anemia     SICKEL CELL ANEMIA- ASYMPTOMATIC   • Heart attack (CMS/HCC)     SEE'S DR. KEENAN, DENIES CP/SOB   • High blood pressure    • Hx of blood diseases     SICKEL CELL ANEMIA- ASYMPTOAMTIC- NO CURRENT PROBLEMS AT THIS TIME   • Kidney disease     ESRD - ON HEMODIALYSIS HELENA HENRIQUEZ [place] IN [place]. - TDC IN RIGHT CHEST      Past Surgical History:   Procedure Laterality Date   • APPENDECTOMY     • GALLBLADDER SURGERY     • INSERTION PERITONEAL DIALYSIS CATHETER N/A 2021    Procedure: INSERTION PERITONEAL DIALYSIS CATHETER LAPAROSCOPIC;  Surgeon: Miki Mendoza MD;  Location: [facility] MAIN OR;  Service: General;  Laterality: N/A;   • PERITONEAL CATHETER INSERTION      REMOVED    • TUBAL ABDOMINAL LIGATION       General Information     Row Name 21 1057          OT Time and Intention    Document Type  evaluation  -GC     Mode of Treatment  individual therapy;occupational therapy  -GC     Row Name 21 1057          General Information    Patient Profile Reviewed  yes  -GC     Existing Precautions/Restrictions  fall  -GC     Row Name 21 1057          Occupational Profile    Reason for Services/Referral (Occupational Profile)  Patient is a 55-year-old woman admitted for the above diagnosis.  OT referral received to evaluate ADL concerns.  -GC     Row Name 21 1053          Living Environment    Lives With  significant other  Patient reports she lives in an apartment with a friend however plans to move back [facility] with her family.  -     Row Name 09/08/21 1057          Cognition    Orientation Status (Cognition)  oriented to;person;place;situation  -     Row Name 09/08/21 1057          Safety Issues, Functional Mobility    Impairments Affecting Function (Mobility)  balance  -       User Key  (r) = Recorded By, (t) = Taken By, (c) = Cosigned By    Initials Name Provider Type     Olga Crespo OT Occupational Therapist          Mobility/ADL's     Row Name 09/08/21 1058          Transfers    Comment (Transfers)  Patient is ambulating to and from the bathroom with supervision to ensure safety with dynamic balance.  -     Sit-Stand [place] (Transfers)  independent  -     Row Name 09/08/21 1058          Activities of Daily Living    BADL Assessment/Intervention  -- Patient is able to perform basic self-care activities with supervision to ensure safety with dynamic balance to decrease risk for falls.  -       User Key  (r) = Recorded By, (t) = Taken By, (c) = Cosigned By    Initials Name Provider Type     Olga Crespo OT Occupational Therapist        Obj/Interventions     Row Name 09/08/21 1059          Sensory Assessment (Somatosensory)    Sensory Assessment (Somatosensory)  sensation intact  -     Row Name 09/08/21 1059          Vision Assessment/Intervention    Visual Impairment/Limitations  WFL  -[facility] Name 09/08/21 1059          Range of Motion Comprehensive    General Range of Motion  bilateral upper extremity ROM WFL  -     Row Name 09/08/21 1059          Strength Comprehensive (MMT)    General Manual Muscle Testing (MMT) Assessment  no strength deficits identified  -     Row Name 09/08/21 1059          Balance    Dynamic Standing Balance  mild impairment  -       User Key  (r) = Recorded By, (t) = Taken By, (c) = Cosigned By    Initials Name Provider Type    Olga Parker OT Occupational  Therapist        Goals/Plan    No documentation.       Clinical Impression     Row Name 09/08/21 1059          Pain Assessment    Additional Documentation  Pain Scale: Numbers Pre/Post-Treatment (Group) Patient denied pain on evaluation  -     Row Name 09/08/21 1059          Plan of Care Review    Progress  no change  -     Outcome Summary  Patient is able to perform basic self-care activities and transfers with supervision to ensure safety with dynamic balance therefore no further skilled OT services indicated at this time.  -     Row Name 09/08/21 1059          Therapy Assessment/Plan (OT)    Criteria for Skilled Therapeutic Interventions Met (OT)  no  -GC     Therapy Frequency (OT)  evaluation only  -       User Key  (r) = Recorded By, (t) = Taken By, (c) = Cosigned By    Initials Name Provider Type    GC Olga Crespo OT Occupational Therapist        Outcome Measures     Row Name 09/08/21 1101          How much help from another is currently needed...    Putting on and taking off regular lower body clothing?  4  -GC     Bathing (including washing, rinsing, and drying)  3  -GC     Toileting (which includes using toilet bed pan or urinal)  4  -GC     Putting on and taking off regular upper body clothing  4  -GC     Taking care of personal grooming (such as brushing teeth)  4  -GC     Eating meals  4  -GC     AM-PAC 6 Clicks Score (OT)  23  -GC     Row Name 09/08/21 1101          Functional Assessment    Outcome Measure Options  AM-PAC 6 Clicks Daily Activity (OT);Optimal Instrument  -GC     Row Name 09/08/21 1101          Optimal Instrument    Optimal Instrument  Optimal - 3  -GC     Bending/Stooping  1  -GC     Standing  1  -GC     Reaching  1  -GC     From the list, choose the 3 activities you would most like to be able to do without any difficulty  Bending/stooping;Reaching;Standing  -GC     Total Score Optimal - 3  3  -GC       User Key  (r) = Recorded By, (t) = Taken By, (c) = Cosigned By    Initials  Name Provider Type    Olga Parker OT Occupational Therapist            OT Recommendation and Plan  Therapy Frequency (OT): evaluation only  Plan of Care Review  Progress: no change  Outcome Summary: Patient is able to perform basic self-care activities and transfers with supervision to ensure safety with dynamic balance therefore no further skilled OT services indicated at this time.     Time Calculation:   Time Calculation- OT     Row Name 09/08/21 1102             Time Calculation- OT    OT Received On  09/08/21  -GC         Untimed Charges    OT Eval/Re-eval Minutes  35  -GC         Total Minutes    Untimed Charges Total Minutes  35  -GC       Total Minutes  35  -GC        User Key  (r) = Recorded By, (t) = Taken By, (c) = Cosigned By    Initials Name Provider Type     Olga Crespo OT Occupational Therapist        Therapy Charges for Today     Code Description Service Date Service Provider Modifiers Qty    86396838321 HC OT EVAL LOW COMPLEXITY 3 9/8/2021 Olga Crespo OT GO 1               Olga Crespo OT  9/8/2021

## 2021-09-08 NOTE — CONSULTS
"Nutrition Services    Patient Name: Clarissa Augustin  YOB: 1965  MRN: [id number]  Admission date: 8/29/2021      CLINICAL NUTRITION ASSESSMENT      Reason for Assessment  BMI, LOS   H&P:    Past Medical History:   Diagnosis Date   • Anemia     SICKEL CELL ANEMIA- ASYMPTOMATIC   • Heart attack (CMS/HCC)     SEE'S DR. KEENAN, DENIES CP/SOB   • High blood pressure    • Hx of blood diseases     SICKEL CELL ANEMIA- ASYMPTOAMTIC- NO CURRENT PROBLEMS AT THIS TIME   • Kidney disease     ESRD - ON HEMODIALYSIS HELENA HENRIQUEZ, TURNER SILVESTRE IN [place] - TDC IN RIGHT CHEST         Current Problems:   Active Hospital Problems    Diagnosis    • Hyperkalemia    • Sickle cell crisis (CMS/HCC)    • Essential hypertension    • ESRD (end stage renal disease) on dialysis (CMS/HCC)         Nutrition/Diet History         Narrative     RD consult for low BMI & 10 day LOS.  Per MD notes from yesterday, possible discharge today.  Hx of ESRD with hemodialysis, but no dialysis for 6 days prior to admission.  Recent PD catheter placed on previous admission.   Wt down 42# since admission if wt accurate.  In review of chart wt down 55# since April, a 33% change.  Attempted to call pt via phone but no answer.  Consuming 50-75% meals.     Anthropometrics        Current Height, Weight Height: 167.6 cm (66\")  Weight: 50.8 kg (111 lb 15.9 oz)   Current BMI Body mass index is 18.08 kg/m².       Weight Hx  Wt Readings from Last 30 Encounters:   09/08/21 0500 50.8 kg (111 lb 15.9 oz)   09/05/21 0527 58.2 kg (128 lb 4.9 oz)   09/04/21 0513 60.7 kg (133 lb 13.1 oz)   09/03/21 0505 60.7 kg (133 lb 13.1 oz)   09/02/21 0500 62.1 kg (136 lb 14.5 oz)   09/01/21 0600 55.5 kg (122 lb 5.7 oz)   08/29/21 0332 64.5 kg (142 lb 3.2 oz)   08/20/21 0942 63.5 kg (140 lb)   08/17/21 1347 65.7 kg (144 lb 12.8 oz)   04/06/21 0000 75.3 kg (166 lb)   03/22/21 0000 75.3 kg (166 lb)   02/23/21 0000 74.8 kg (165 lb)   01/15/21 0000 75.3 kg (166 lb) "   01/11/21 0000 76.2 kg (168 lb)            Wt Change Observation 31# change since admission with dialysis, 55# change since April     Estimated/Assessed Needs       Energy Requirements    EST Needs (kcal/day) 2076 (35 kcal)       Protein Requirements    EST Daily Needs (g/day) 71-77 (1.2-1.3)       Fluid Requirements     Estimated Needs (mL/day) 750-1500     Labs/Medications         Pertinent Labs Reviewed.   Results from last 7 days   Lab Units 09/07/21  0907 09/04/21  0914 09/03/21  0317 09/02/21  1433 09/02/21  1433   SODIUM mmol/L 135* 134* 140   < > 135*   POTASSIUM mmol/L 3.7 4.0 3.1*   < > 4.7   CHLORIDE mmol/L 100 104 104   < > 100   CO2 mmol/L 20.4* 16.5* 24.0   < > 20.6*   BUN mg/dL 36* 30* 14   < > 44*   CREATININE mg/dL 6.61* 4.78* 2.53*   < > 6.01*   CALCIUM mg/dL 9.2 9.1 9.0   < > 9.1   BILIRUBIN mg/dL  --   --  2.8*  --  2.5*   ALK PHOS U/L  --   --  139*  --  144*   ALT (SGPT) U/L  --   --  7  --  7   AST (SGOT) U/L  --   --  32  --  40*   GLUCOSE mg/dL 152* 89 144*   < > 146*    < > = values in this interval not displayed.     Results from last 7 days   Lab Units 09/07/21  0907   HEMOGLOBIN g/dL 7.8*   HEMATOCRIT % 22.1*       Pertinent Medications Reviewed.     Current Nutrition Orders & Evaluation of Intake       Oral Nutrition     Current PO Diet Diet Regular; Renal   Supplement No active supplement orders       Nutrition Diagnosis         Nutrition Dx Problem 1 Underweight related to ESRD as evidenced by decreased appetite. and unintended wt change.     Nutrition Intervention         Continue Renal diet  Add Novarsource Renal 1/day (475 kcal & 22 gm protein)     Monitor/Evaluation        Monitor PO intake, Supplement intake, Pertinent labs, Weight     Nutrition Discharge Plan         Renal diet       Electronically signed by:  Symone Mendoza RD  09/08/21 08:09 EDT

## 2021-09-08 NOTE — DISCHARGE SUMMARY
"               [facility]         DISCHARGE SUMMARY    Patient Name: Clarissa Augustin  : 1965  MRN: [id number]    Date of Admission: 2021  Date of Discharge:  21  Primary Care Physician: Mirian Caicedo    Consults     Date and Time Order Name Status Description    2021  9:16 AM IP General Consult (Use specialty-specific consult if known) Completed           Presenting Problem:   Sickle cell crisis (CMS/[facility]) [D57.00]    Active and Resolved Hospital Problems:  Active Hospital Problems    Diagnosis POA   • Essential hypertension [I10] Unknown   • ESRD (end stage renal disease) on dialysis (CMS/[facility]) [N18.6, Z99.2] Not Applicable      Resolved Hospital Problems    Diagnosis POA   • Hyperkalemia [E87.5] Unknown   • Sickle cell crisis (CMS/[facility]) [D57.00] Yes         Hospital Course     Hospital Course:  Clarisas Augustin is a 55 y.o. female with history of ESRD sickle cell disease admitted to the hospital for sickle cell crisis with acute generalized abdominal pain and bone aches overall.  Patient was treated with pain medications, she was found to be severely anemic with hemoglobin down to 5 she received 2 units of blood transfusion with significant improvement in hemoglobin.  Patient also seen by physical therapy who deemed patient discharging to rehab.  Patient is continued on her scheduled dialysis.  On the day of discharge patient is asymptomatic feels that she is at her baseline.  She will be discharged home and will continue regular hemodialysis sessions on TTS schedule.      Day of Discharge     /77 (BP Location: Right arm, Patient Position: Lying)   Pulse 74   Temp 98.2 °F (36.8 °C) (Oral)   Resp 18   Ht 167.6 cm (66\")   Wt 50.8 kg (111 lb 15.9 oz)   SpO2 100%   BMI 18.08 kg/m²   CONSTITUTIONAL: no acute distress  NEUROLOGIC: awake, alert, oriented, following commands  HEAD:NC/AT  EYES: EOMI, PERRLA  ENT: clear oropharynx  CARDIOVASCULAR: RRR  PULMONARY: " lungs clear to auscultation bilaterally, no rales, wheezing, or rhonchi  GASTROINTESTINAL: +bs, soft, non-tender, non-distended  MUSCULOSKELETAL: moving all extremities, no edema  SKIN: warm, dry    Pertinent  and/or Most Recent Results     LAB RESULTS:      Lab 09/07/21  0907 09/05/21  0949 09/04/21  1133 09/03/21 0317 09/02/21  2124   WBC 9.49 10.88* 10.33 10.66 11.54*   HEMOGLOBIN 7.8* 6.8* 5.7* 5.1* 5.4*   HEMATOCRIT 22.1* 19.5* 16.4* 14.4* 15.0*   PLATELETS 342 218 261 233 233   NEUTROS ABS  --  7.40*  --   --  8.08*   EOS ABS  --  0.76*  --   --  0.46*   MCV 88.8 87.4 89.6 86.2 84.7         Lab 09/07/21  0907 09/04/21  0914 09/03/21 0317 09/02/21  1433   SODIUM 135* 134* 140 135*   POTASSIUM 3.7 4.0 3.1* 4.7   CHLORIDE 100 104 104 100   CO2 20.4* 16.5* 24.0 20.6*   ANION GAP 14.6 13.5 12.0 14.4   BUN 36* 30* 14 44*   CREATININE 6.61* 4.78* 2.53* 6.01*   GLUCOSE 152* 89 144* 146*   CALCIUM 9.2 9.1 9.0 9.1         Lab 09/03/21 0317 09/02/21  1433   TOTAL PROTEIN 7.1 7.5   ALBUMIN 3.90 4.00   GLOBULIN 3.2 3.5   ALT (SGPT) 7 7   AST (SGOT) 32 40*   BILIRUBIN 2.8* 2.5*   ALK PHOS 139* 144*                 Lab 09/03/21  0300   ABO TYPING O   RH TYPING Positive   ANTIBODY SCREEN Positive         Brief Urine Lab Results  (Last result in the past 365 days)      Color   Clarity   Blood   Leuk Est   Nitrite   Protein   CREAT   Urine HCG        08/26/21 0817               Negative         Microbiology Results (last 10 days)     ** No results found for the last 240 hours. **                           Labs Pending at Discharge:        Discharge Details        Discharge Medications      Continue These Medications      Instructions Start Date   acetaminophen 500 MG tablet  Commonly known as: TYLENOL   1,000 mg, Oral, Every 6 Hours PRN      amLODIPine 10 MG tablet  Commonly known as: NORVASC   10 mg, Oral, Daily      carvedilol 25 MG tablet  Commonly known as: COREG   25 mg, Oral, 2 Times Daily      folic acid 1 MG  tablet  Commonly known as: FOLVITE   1,000 mcg, Oral, Daily      furosemide 80 MG tablet  Commonly known as: LASIX   80 mg, Oral, 2 Times Daily      HYDROcodone-acetaminophen 5-325 MG per tablet  Commonly known as: NORCO   1-2 tablets, Oral, Every 4 Hours PRN      losartan 100 MG tablet  Commonly known as: COZAAR   80 mg, Oral, 2 Times Daily      magnesium oxide 400 MG tablet  Commonly known as: MAG-OX   1 tablet, Oral, Daily      nitroglycerin 0.4 MG SL tablet  Commonly known as: NITROSTAT   0.4 mg, Sublingual, Every 5 Minutes PRN      ondansetron 4 MG tablet  Commonly known as: ZOFRAN   4 mg, Oral, Every 6 Hours      ProAir  (90 Base) MCG/ACT inhaler  Generic drug: albuterol sulfate HFA   2 puffs, Inhalation, 4 Times Daily - RT, ONLY TAKES FOR PROBLEMS WITH SICKEL CELL      Senexon-S 8.6-50 MG per tablet  Generic drug: sennosides-docusate   1 tablet, Oral, 2 Times Daily      sodium bicarbonate 650 MG tablet   650 mg, Oral, 2 Times Daily             Allergies   Allergen Reactions   • Latex Rash     OR NOTIFIED- KETAN         Discharge Disposition:  Home or Self Care    Diet: Renal  Discharge condition: Stable        Discharge Activity: Resume increase gradually        CODE STATUS:  Code Status and Medical Interventions:   Ordered at: 08/29/21 1000     Code Status:    CPR     Medical Interventions (Level of Support Prior to Arrest):    Full         Future Appointments   Date Time Provider Department Center   9/10/2021  9:45 AM Miki Mendoza MD [facility]   12/23/2021 10:45 AM Fabian Gutierrez MD [facility]           Time spent on Discharge including face to face service: 40 minutes    Electronically signed by Demarco Hedrick MD, 09/08/21, 12:45 PM EDT.

## 2021-09-09 ENCOUNTER — READMISSION MANAGEMENT (OUTPATIENT)
Dept: CALL CENTER | Facility: HOSPITAL | Age: 56
End: 2021-09-09

## 2021-09-09 NOTE — OUTREACH NOTE
Prep Survey      Responses   [facility]   Is LACE score < 7 ?  No   Emergency Room discharge w/ pulse ox?  No   Eligibility  Readm Mgmt   Discharge diagnosis  Sickle cell crisis ,Essential hypertension   Does the patient have one of the following disease processes/diagnoses(primary or secondary)?  Other   General alerts for this patient  Dialysis pt   Prep survey completed?  Yes          Vandana Guaman RN

## 2021-09-10 ENCOUNTER — READMISSION MANAGEMENT (OUTPATIENT)
Dept: CALL CENTER | Facility: HOSPITAL | Age: 56
End: 2021-09-10

## 2021-09-10 LAB
BH BB BLOOD EXPIRATION DATE: NORMAL
BH BB BLOOD EXPIRATION DATE: NORMAL
BH BB BLOOD TYPE BARCODE: 9500
BH BB BLOOD TYPE BARCODE: 9500
BH BB DISPENSE STATUS: NORMAL
BH BB DISPENSE STATUS: NORMAL
BH BB PRODUCT CODE: NORMAL
BH BB PRODUCT CODE: NORMAL
BH BB UNIT NUMBER: NORMAL
BH BB UNIT NUMBER: NORMAL
CROSSMATCH INTERPRETATION: NORMAL
CROSSMATCH INTERPRETATION: NORMAL
UNIT  ABO: NORMAL
UNIT  ABO: NORMAL
UNIT  RH: NORMAL
UNIT  RH: NORMAL

## 2021-09-10 NOTE — OUTREACH NOTE
Medical Week 1 Survey      Responses   [facility] patient discharged from?  [facility]   Does the patient have one of the following disease processes/diagnoses(primary or secondary)?  Other   Week 1 attempt successful?  Yes   Call end time  1157   General alerts for this patient  Dialysis pt   Discharge diagnosis  Sickle cell crisis ,Essential hypertension   Meds reviewed with patient/caregiver?  N/A   Does the patient have all medications ordered at discharge?  N/A   Comments regarding appointments  Dr. Mendoza appt 9-10-21 -Pt missed appt will reschedule.  Has seen provider at  Dialysis.    Does the patient have a primary care provider?   Yes   Comments regarding PCP  Will call   Has the patient kept scheduled appointments due by today?  No   What is preventing the patient from keeping their appointments?  -- [Pt states she did not know she had an appt with Dr. Mendoza this AM. Reviewed AVS with Pt. She will call at this time. ]   Nursing Interventions  Educated on importance of keeping appointment, Advised to reschedule appointment   Psychosocial issues?  No   Did the patient receive a copy of their discharge instructions?  Yes   Nursing interventions  Reviewed instructions with patient   What is the patient's perception of their health status since discharge?  Improving   Is the patient/caregiver able to teach back signs and symptoms related to disease process for when to call PCP?  Yes   Is the patient/caregiver able to teach back signs and symptoms related to disease process for when to call 911?  Yes   Is the patient/caregiver able to teach back the hierarchy of who to call/visit for symptoms/problems? PCP, Specialist, Home health nurse, Urgent Care, ED, 911  Yes   If the patient is a current smoker, are they able to teach back resources for cessation?  Smoking cessation medications [Cutting back. ]   Week 1 call completed?  Yes   Wrap up additional comments  Advised Pt to call Dr. Canela office and  PCP office for appt.           Carolee Zavaleta RN

## 2021-09-16 ENCOUNTER — READMISSION MANAGEMENT (OUTPATIENT)
Dept: CALL CENTER | Facility: HOSPITAL | Age: 56
End: 2021-09-16

## 2021-09-16 NOTE — OUTREACH NOTE
Medical Week 2 Survey      Responses   [facility] patient discharged from?  [facility]   Does the patient have one of the following disease processes/diagnoses(primary or secondary)?  Other   Week 2 attempt successful?  No   Unsuccessful attempts  Attempt 1   General alerts for this patient  Dialysis pt   Discharge diagnosis  Sickle cell crisis ,Essential hypertension          Carolee Zavaleta RN

## 2021-09-23 ENCOUNTER — READMISSION MANAGEMENT (OUTPATIENT)
Dept: CALL CENTER | Facility: HOSPITAL | Age: 56
End: 2021-09-23

## 2021-09-23 NOTE — OUTREACH NOTE
Medical Week 2 Survey      Responses   [facility] patient discharged from?  [facility]   Does the patient have one of the following disease processes/diagnoses(primary or secondary)?  Other   Week 2 attempt successful?  Yes   Call start time  1042   General alerts for this patient  Dialysis pt   Discharge diagnosis  Sickle cell crisis ,Essential hypertension   Call end time  1046   Meds reviewed with patient/caregiver?  Yes   Is the patient taking all medications as directed (includes completed medication regime)?  Yes   Comments regarding appointments  HD today. Has seen Dr. Mendoza.    Does the patient have a primary care provider?   Yes   Comments regarding PCP  Has Followed since discharge    Has the patient kept scheduled appointments due by today?  Yes   Psychosocial issues?  No   Did the patient receive a copy of their discharge instructions?  Yes   Nursing interventions  Reviewed instructions with patient   What is the patient's perception of their health status since discharge?  Improving   Is the patient/caregiver able to teach back signs and symptoms related to disease process for when to call PCP?  Yes   Is the patient/caregiver able to teach back signs and symptoms related to disease process for when to call 911?  Yes   Is the patient/caregiver able to teach back the hierarchy of who to call/visit for symptoms/problems? PCP, Specialist, Home health nurse, Urgent Care, ED, 911  Yes   Week 2 Call Completed?  Yes   Wrap up additional comments  Pt reports that she is doing well and has went to Dialysis today. Pt states that she has followed up with her Drs including Dr. Mendoza and is taking all meds as ordered.           Carolee Zavaleta RN

## 2021-09-27 ENCOUNTER — OFFICE VISIT (OUTPATIENT)
Dept: SURGERY | Facility: CLINIC | Age: 56
End: 2021-09-27

## 2021-09-27 VITALS — HEIGHT: 66 IN | WEIGHT: 111.99 LBS | RESPIRATION RATE: 16 BRPM | BODY MASS INDEX: 18 KG/M2

## 2021-09-27 DIAGNOSIS — Z99.2 ESRD (END STAGE RENAL DISEASE) ON DIALYSIS (HCC): Primary | ICD-10-CM

## 2021-09-27 DIAGNOSIS — N18.6 ESRD (END STAGE RENAL DISEASE) ON DIALYSIS (HCC): Primary | ICD-10-CM

## 2021-09-27 PROCEDURE — 99024 POSTOP FOLLOW-UP VISIT: CPT | Performed by: SURGERY

## 2021-09-27 NOTE — PROGRESS NOTES
Chief Complaint  Post-op    Subjective          Clarissa Augustin presents to [facility] GENERAL SURGERY  History of Present Illness    Clarissa Augustin is a 55 y.o. female  who presents today for a postoperative visit.     Patient is here for a follow-up after a placement of a peritoneal dialysis catheter.  She is having some gas pains in her abdomen but apparently the catheter exchanges are going well.    Past History:  Medical History: has a past medical history of Anemia, Heart attack (CMS/HCC), High blood pressure, blood diseases, and Kidney disease.   Surgical History: has a past surgical history that includes Appendectomy; Gallbladder surgery; Peritoneal catheter insertion; Tubal ligation; and Peritoneal Dialysis Catheter (N/A, 8/26/2021).   Family History: family history includes Breast cancer in an other family member; Diabetes in her mother.   Social History: reports that she has been smoking cigarettes. She has been smoking about 0.00 packs per day. She has never used smokeless tobacco. She reports that she does not drink alcohol and does not use drugs.  Allergies: Latex       Current Outpatient Medications:   •  acetaminophen (TYLENOL) 500 MG tablet, Take 1,000 mg by mouth Every 6 (Six) Hours As Needed., Disp: , Rfl:   •  amLODIPine (NORVASC) 10 MG tablet, Take 10 mg by mouth Daily., Disp: , Rfl:   •  carvedilol (COREG) 25 MG tablet, Take 25 mg by mouth 2 (Two) Times a Day., Disp: , Rfl:   •  folic acid (FOLVITE) 1 MG tablet, Take 1,000 mcg by mouth Daily., Disp: , Rfl:   •  furosemide (LASIX) 80 MG tablet, Take 80 mg by mouth 2 (Two) Times a Day., Disp: , Rfl:   •  HYDROcodone-acetaminophen (NORCO) 5-325 MG per tablet, Take 1-2 tablets by mouth Every 4 (Four) Hours As Needed (Pain)., Disp: 10 tablet, Rfl: 0  •  losartan (COZAAR) 100 MG tablet, Take 80 mg by mouth 2 (Two) Times a Day., Disp: , Rfl:   •  magnesium oxide (MAG-OX) 400 MG tablet, Take 1 tablet by mouth  "Daily., Disp: , Rfl:   •  nitroglycerin (NITROSTAT) 0.4 MG SL tablet, Place 0.4 mg under the tongue Every 5 (Five) Minutes As Needed., Disp: , Rfl:   •  ondansetron (ZOFRAN) 4 MG tablet, Take 4 mg by mouth Every 6 (Six) Hours., Disp: , Rfl:   •  ProAir  (90 Base) MCG/ACT inhaler, Inhale 2 puffs 4 (Four) Times a Day. ONLY TAKES FOR PROBLEMS WITH SICKEL CELL, Disp: , Rfl:   •  Senexon-S 8.6-50 MG per tablet, Take 1 tablet by mouth 2 (Two) Times a Day., Disp: , Rfl:   •  sodium bicarbonate 650 MG tablet, Take 650 mg by mouth 2 (Two) Times a Day., Disp: , Rfl:        Physical Exam  He appears well today.  Her catheter exit site looks good and we went ahead and removed her sutures today  Objective     Vital Signs:   Resp 16   Ht 167.6 cm (65.98\")   Wt 50.8 kg (111 lb 15.9 oz)   BMI 18.09 kg/m²              Assessment and Plan    Diagnoses and all orders for this visit:    1. ESRD (end stage renal disease) on dialysis (CMS/[facility]) (Primary)    The patient will be leaving her community and will be moving to [place].  She indicated that the folks at her dialysis center are working on transferring her care to a center in the [place] area.  I will see her back on an as-needed basis but of asked her to call me if we can be of any help to her in the future.      "

## 2021-09-29 ENCOUNTER — READMISSION MANAGEMENT (OUTPATIENT)
Dept: CALL CENTER | Facility: HOSPITAL | Age: 56
End: 2021-09-29

## 2021-09-29 NOTE — OUTREACH NOTE
Medical Week 3 Survey      Responses   [facility] patient discharged from?  [facility]   Does the patient have one of the following disease processes/diagnoses(primary or secondary)?  Other   Week 3 attempt successful?  No   Unsuccessful attempts  Attempt 1          Cori Roberson RN

## 2021-09-30 ENCOUNTER — READMISSION MANAGEMENT (OUTPATIENT)
Dept: CALL CENTER | Facility: HOSPITAL | Age: 56
End: 2021-09-30

## 2021-09-30 NOTE — OUTREACH NOTE
Medical Week 3 Survey      Responses   [facility] patient discharged from?  [facility]   Does the patient have one of the following disease processes/diagnoses(primary or secondary)?  Other   Week 3 attempt successful?  No   Unsuccessful attempts  Attempt 2   General alerts for this patient  Dialysis pt   Discharge diagnosis  Sickle cell crisis ,Essential hypertension          Carolee Zavaleta RN

## 2021-10-24 ENCOUNTER — OUT OF OFFICE VISIT (OUTPATIENT)
Dept: URBAN - METROPOLITAN AREA MEDICAL CENTER 33 | Facility: MEDICAL CENTER | Age: 56
End: 2021-10-24

## 2021-10-24 DIAGNOSIS — K92.1 ACUTE MELENA: ICD-10-CM

## 2021-10-24 DIAGNOSIS — D64.89 ANEMIA DUE TO OTHER CAUSE: ICD-10-CM

## 2021-10-24 DIAGNOSIS — R10.13 ABDOMINAL DISCOMFORT, EPIGASTRIC: ICD-10-CM

## 2021-10-24 DIAGNOSIS — K92.0 BLOODY EMESIS: ICD-10-CM

## 2021-10-24 PROCEDURE — G8427 DOCREV CUR MEDS BY ELIG CLIN: HCPCS | Performed by: INTERNAL MEDICINE

## 2021-10-24 PROCEDURE — 99223 1ST HOSP IP/OBS HIGH 75: CPT | Performed by: INTERNAL MEDICINE

## 2021-10-25 ENCOUNTER — OUT OF OFFICE VISIT (OUTPATIENT)
Dept: URBAN - METROPOLITAN AREA MEDICAL CENTER 33 | Facility: MEDICAL CENTER | Age: 56
End: 2021-10-25

## 2021-10-25 DIAGNOSIS — Z12.11 AVERAGE RISK FOR CRC. DUE TO PT'S CO-MORBID STATE WITH END STAGE DEMENTIA, HIGH RISK FOR ANESTHESIA (PER NEUROLOGY); INABILITY TO TAKE A BOWEL PREP....WOULD NOT ADVISE ANY COLORECTAL CANCER SCREENING INCLUDING STOOL TEST FOR FECAL BLOOD.: ICD-10-CM

## 2021-10-25 DIAGNOSIS — K92.0 BLOODY EMESIS: ICD-10-CM

## 2021-10-25 DIAGNOSIS — D64.89 ANEMIA DUE TO OTHER CAUSE: ICD-10-CM

## 2021-10-25 DIAGNOSIS — R19.5 ABNORMAL CONSISTENCY OF STOOL: ICD-10-CM

## 2021-10-25 DIAGNOSIS — R10.13 ABDOMINAL DISCOMFORT, EPIGASTRIC: ICD-10-CM

## 2021-10-25 PROCEDURE — 99232 SBSQ HOSP IP/OBS MODERATE 35: CPT | Performed by: PHYSICIAN ASSISTANT

## 2021-10-25 PROCEDURE — 996 AG2 (NON BILLABLE): Performed by: PHYSICIAN ASSISTANT

## 2021-10-26 ENCOUNTER — OUT OF OFFICE VISIT (OUTPATIENT)
Dept: URBAN - METROPOLITAN AREA MEDICAL CENTER 33 | Facility: MEDICAL CENTER | Age: 56
End: 2021-10-26

## 2021-10-26 DIAGNOSIS — D50.9 ANEMIA: ICD-10-CM

## 2021-10-26 DIAGNOSIS — K21.00 ALKALINE REFLUX ESOPHAGITIS: ICD-10-CM

## 2021-10-26 DIAGNOSIS — R11.2 ACUTE NAUSEA WITH NONBILIOUS VOMITING: ICD-10-CM

## 2021-10-26 PROCEDURE — 43235 EGD DIAGNOSTIC BRUSH WASH: CPT | Performed by: INTERNAL MEDICINE

## 2021-10-26 PROCEDURE — 45378 DIAGNOSTIC COLONOSCOPY: CPT | Performed by: INTERNAL MEDICINE

## 2021-12-22 PROBLEM — I50.32 DIASTOLIC CHF, CHRONIC (HCC): Status: ACTIVE | Noted: 2021-12-22

## 2021-12-22 PROBLEM — Z72.0 NICOTINE USE: Status: ACTIVE | Noted: 2021-12-22

## 2021-12-23 ENCOUNTER — TELEPHONE (OUTPATIENT)
Dept: CARDIOLOGY | Facility: CLINIC | Age: 56
End: 2021-12-23

## 2024-09-27 NOTE — H&P
"   History and Physical      Patient Name: Clarissa Augustin   Patient ID: [id number]   Sex: Female   YOB: 1965    Referring Provider: Ryley Moran    Visit Date: March 22, 2021    Provider: Miki Mendoza MD   Location: [facility]   Location Address: [address] [place] [address]  [id number]   Location Phone: [phone]          Chief Complaint  · Outpatient History & Physical / Surgical Orders  · PD cath dysfunction      History Of Present Illness     Ms. Augustin came in today for evaluation. She is a very nice lady who had a peritoneal dialysis catheter placed about a month ago. Unfortunately it is not functioning now. She came in today to be seen.       Past Medical History  Anemia, Unspecified; Blood Diseases; Heart Attack; High blood pressure; Kidney Disease         Past Surgical History  Appendectomy; Gallbladder; PD Catheter Placement         Medication List  acetaminophen 500 mg oral tablet; hydralazine 25 mg oral tablet; metoprolol succinate 50 mg oral tablet extended release 24 hr; nitroglycerin 0.4 mg sublingual tablet, sublingual         Allergy List  Latex Exam Gloves       Allergies Reconciled  Family Medical History  Diabetes, unspecified type; Family history of breast cancer         Social History  Tobacco (Current every day)         Vitals  Date Time BP Position Site L\R Cuff Size HR RR TEMP (F) WT  HT  BMI kg/m2 BSA m2 O2 Sat FR L/min FiO2 HC       03/22/2021 01:35 PM       16  165lbs 16oz 5'  6\" 26.79 1.87             Physical Examination  · Constitutional  o Appearance  o : well-nourished, well developed, alert, in no acute distress  · Head and Face  o Head  o :   § Inspection  § : atraumatic, normocephalic  · Neck  o Inspection/Palpation  o : supple, normal range of motion  · Respiratory  o Inspection of Chest  o : normal inspection  o Auscultation of Lungs  o : breath sounds normal, no distress, clear to ascultate " bilaterally  · Cardiovascular  o Heart  o :   § Auscultation of Heart  § : regular rate and rhythm, no murmur, gallop or rub  · Gastrointestinal  o Abdominal Examination  o : normal bowel sounds, non-tender, soft          Assessment  · Pre-Surgical Orders     V72.84  · PD catheter dysfunction, initial encounter     996.56/T85.611A       Very nice lady who had a PD catheter placement about a month ago. The exchanges are not working.       Plan  · Orders  o [facility] Pre-Op Covid-19 Screening (19793) - 996.56/T85.611A - 03/22/2021  o General Surgery Order (GENOR) - 996.56/T85.611A - 03/24/2021  · Medications  o Medications have been Reconciled  o Transition of Care or Provider Policy  · Instructions  o PLAN:  o Handouts Provided-Pre-Procedure Instructions including date and time and location of procedure.  o Surgical Facility: [facility]  o ****Surgical Orders****  o ****Patient Status****  o Outpatient  o RISK AND BENEFITS:  o Consent for surgery: Given these options, the patient has verbally expressed an understanding of the risks of surgery and finds these risks acceptable. We will proceed with surgery as soon as possible.  o Consult Anesthesia for any post-operative block, or any pain management procedure deemed necessary by the anestesiologist for adequate post-operative pain control.   o O.R. PREP: Per protocol  o SCD's preoperatively  o PLEASE SIGN PERMIT FOR: Laparoscopic repositioning of peritoneal dialysis catheter  o *__Kefzol 2 gram IV on call to OR.  o *___The above History and Physical Examination has been completed within 30 days of admission.  o Electronically Identified Patient Education Materials Provided Electronically     We are going to set her up for a laparoscopic repositioning of the catheter and see if we can figure out why her catheter is not functioning and see if we can get that repositioned. I have described the procedure to her as well as the risks and benefits and she is agreeable  to proceeding.             Electronically Signed by: Mary Ann Fowler-, -Author on March 22, 2021 03:07:08 PM  Electronically Co-signed by: Miki Mendoza MD -Reviewer on March 23, 2021 12:38:02 PM   Yes

## (undated) DEVICE — SLV SCD LEG COMFORT KENDALLSCD MD REPROC

## (undated) DEVICE — VISUALIZATION SYSTEM: Brand: CLEARIFY

## (undated) DEVICE — ENDOPATH PNEUMONEEDLE INSUFFLATION NEEDLES WITH LUER LOCK CONNECTORS 120MM: Brand: ENDOPATH

## (undated) DEVICE — SUT ETHLN 3-0 FS118IN 663H

## (undated) DEVICE — GLV SURG SENSICARE PI ORTHO SZ8 LF STRL

## (undated) DEVICE — SUT SILK 0 TIES 18IN A186H BX36

## (undated) DEVICE — LAP-PORT CLOSURE DEVICE: Brand: LAP-PORT CLOSURE DEVICE

## (undated) DEVICE — INTENDED FOR TISSUE SEPARATION, AND OTHER PROCEDURES THAT REQUIRE A SHARP SURGICAL BLADE TO PUNCTURE OR CUT.: Brand: BARD-PARKER ® CARBON RIB-BACK BLADES

## (undated) DEVICE — SYR LUERLOK 50ML

## (undated) DEVICE — DECANT BG O JET

## (undated) DEVICE — THIS SET CONSISTS OF A FEMALE LOCKING CONNECTOR/ON-OFF CLAMP ASSEMBLY, TUBING AND DOUBLE SEALING MALE LUER LOCK CONNECTOR. THIS SET IS TO BE USED WITH THE BAXTER LOCKING TITANIUM ADAPTER FOR PERITONEAL DIALYSIS CATHETER IN DISCONNECT APPLICATIONS AND IN CYCLER APPLICATIONS WHERE ASEPTIC CONNECTIONS AND DISCONNECTIONS ARE PERFORMED AT THE TRANSFER SET/CYCLER SET JUNCTURE.: Brand: MINICAP

## (undated) DEVICE — ENDOPATH XCEL WITH OPTIVIEW TECHNOLOGY BLADELESS TROCARS WITH STABILITY SLEEVES: Brand: ENDOPATH XCEL OPTIVIEW

## (undated) DEVICE — PENCL E/S SMOKEEVAC W/TELESCP CANN

## (undated) DEVICE — GAUZE,SPONGE,4"X4",16PLY,STRL,LF,10/TRAY: Brand: MEDLINE

## (undated) DEVICE — 3M™ STERI-STRIP™ REINFORCED ADHESIVE SKIN CLOSURES, R1547, 1/2 IN X 4 IN (12 MM X 100 MM), 6 STRIPS/ENVELOPE: Brand: 3M™ STERI-STRIP™

## (undated) DEVICE — GENERAL LAPAROSCOPY-LF: Brand: MEDLINE INDUSTRIES, INC.

## (undated) DEVICE — ANTIBACTERIAL UNDYED BRAIDED (POLYGLACTIN 910), SYNTHETIC ABSORBABLE SUTURE: Brand: COATED VICRYL

## (undated) DEVICE — STERILE POLYISOPRENE POWDER-FREE SURGICAL GLOVES WITH EMOLLIENT COATING: Brand: PROTEXIS

## (undated) DEVICE — NON-WOVEN ADHESIVE WOUND DRESSING: Brand: PRIMAPORE ADHESIVE WOUND DRSG 7.2*5CM

## (undated) DEVICE — ENDOPATH XCEL BLADELESS TROCARS WITH STABILITY SLEEVES: Brand: ENDOPATH XCEL

## (undated) DEVICE — PERITONEAL DIALYSIS ACCESSORY,TWO PART TITANIUM LUER ADAPTER: Brand: ARGYLE

## (undated) DEVICE — DRSNG PAD ABD 8X10IN STRL

## (undated) DEVICE — GOWN,REINFRCE,POLY,SIRUS,BREATH SLV,XXLG: Brand: MEDLINE